# Patient Record
Sex: FEMALE | Race: WHITE | NOT HISPANIC OR LATINO | Employment: FULL TIME | URBAN - METROPOLITAN AREA
[De-identification: names, ages, dates, MRNs, and addresses within clinical notes are randomized per-mention and may not be internally consistent; named-entity substitution may affect disease eponyms.]

---

## 2023-01-27 PROBLEM — M99.00 SOMATIC DYSFUNCTION OF HEAD REGION: Status: ACTIVE | Noted: 2023-01-27

## 2023-01-27 PROBLEM — M99.02 SEGMENTAL AND SOMATIC DYSFUNCTION OF THORACIC REGION: Status: ACTIVE | Noted: 2023-01-27

## 2023-01-27 PROBLEM — R79.89 LOW VITAMIN D LEVEL: Status: ACTIVE | Noted: 2023-01-27

## 2023-01-27 PROBLEM — N63.0 BREAST LUMP: Status: ACTIVE | Noted: 2023-01-27

## 2023-01-27 PROBLEM — M99.07 SOMATIC DYSFUNCTION OF RIGHT UPPER EXTREMITY: Status: ACTIVE | Noted: 2023-01-27

## 2023-01-27 PROBLEM — M99.08 SOMATIC DYSFUNCTION OF RIB: Status: ACTIVE | Noted: 2023-01-27

## 2023-01-27 PROBLEM — M21.70 SHORT LEG SYNDROME, RIGHT, ACQUIRED: Status: ACTIVE | Noted: 2023-01-27

## 2023-01-27 PROBLEM — G43.909 MIGRAINE, UNSPECIFIED, NOT INTRACTABLE, WITHOUT STATUS MIGRAINOSUS: Status: ACTIVE | Noted: 2023-01-27

## 2023-01-27 PROBLEM — L98.7 EXCESS SKIN OF BREAST: Status: ACTIVE | Noted: 2023-01-27

## 2023-01-27 PROBLEM — M99.06 SOMATIC DYSFUNCTION OF BOTH LOWER EXTREMITIES: Status: ACTIVE | Noted: 2023-01-27

## 2023-01-27 PROBLEM — R63.2 POLYPHAGIA: Status: ACTIVE | Noted: 2023-01-27

## 2023-01-27 PROBLEM — M99.04 SEGMENTAL AND SOMATIC DYSFUNCTION OF SACRAL REGION: Status: ACTIVE | Noted: 2023-01-27

## 2023-01-27 PROBLEM — R06.00 DYSPNEA: Status: ACTIVE | Noted: 2023-01-27

## 2023-01-27 PROBLEM — M99.01 SEGMENTAL AND SOMATIC DYSFUNCTION OF CERVICAL REGION: Status: ACTIVE | Noted: 2023-01-27

## 2023-01-27 PROBLEM — I10 HYPERTENSION: Status: ACTIVE | Noted: 2023-01-27

## 2023-01-27 PROBLEM — G89.29 BACK PAIN, CHRONIC: Status: ACTIVE | Noted: 2023-01-27

## 2023-01-27 PROBLEM — M41.9 SCOLIOSIS: Status: ACTIVE | Noted: 2023-01-27

## 2023-01-27 PROBLEM — J98.4 RESTRICTIVE LUNG DISEASE: Status: ACTIVE | Noted: 2023-01-27

## 2023-01-27 PROBLEM — Q74.8: Status: ACTIVE | Noted: 2023-01-27

## 2023-01-27 PROBLEM — R22.32 LUMP OF AXILLA, LEFT: Status: ACTIVE | Noted: 2023-01-27

## 2023-01-27 PROBLEM — M54.9 BACK PAIN, CHRONIC: Status: ACTIVE | Noted: 2023-01-27

## 2023-01-27 PROBLEM — M99.03 SOMATIC DYSFUNCTION OF LUMBAR REGION: Status: ACTIVE | Noted: 2023-01-27

## 2023-01-27 PROBLEM — M99.05 SEGMENTAL AND SOMATIC DYSFUNCTION OF PELVIC REGION: Status: ACTIVE | Noted: 2023-01-27

## 2023-01-27 PROBLEM — M43.6 STIFFNESS OF NECK: Status: ACTIVE | Noted: 2023-01-27

## 2023-01-27 RX ORDER — DOCUSATE SODIUM 100 MG/1
CAPSULE, LIQUID FILLED ORAL
COMMUNITY
Start: 2021-07-08 | End: 2023-03-10 | Stop reason: SDUPTHER

## 2023-01-27 RX ORDER — SUMATRIPTAN SUCCINATE 100 MG/1
100 TABLET ORAL ONCE AS NEEDED
COMMUNITY
Start: 2015-10-09 | End: 2023-03-10 | Stop reason: SDUPTHER

## 2023-01-27 RX ORDER — PROPRANOLOL HYDROCHLORIDE 60 MG/1
1 CAPSULE, EXTENDED RELEASE ORAL ONCE
COMMUNITY
Start: 2015-10-06 | End: 2023-03-10 | Stop reason: SDUPTHER

## 2023-01-27 RX ORDER — VALACYCLOVIR HYDROCHLORIDE 1 G/1
TABLET, FILM COATED ORAL
COMMUNITY
Start: 2020-12-31 | End: 2023-03-10 | Stop reason: SDUPTHER

## 2023-01-27 RX ORDER — ALBUTEROL SULFATE 90 UG/1
AEROSOL, METERED RESPIRATORY (INHALATION)
COMMUNITY
Start: 2021-08-31

## 2023-01-27 RX ORDER — ERGOCALCIFEROL 1.25 MG/1
50000 CAPSULE ORAL
COMMUNITY
Start: 2019-05-10 | End: 2023-03-10 | Stop reason: ALTCHOICE

## 2023-01-27 RX ORDER — ACETAMINOPHEN 500 MG
TABLET ORAL
COMMUNITY
Start: 2021-07-08 | End: 2023-09-15

## 2023-02-12 PROBLEM — Q74.8: Status: ACTIVE | Noted: 2019-09-17

## 2023-02-12 PROBLEM — E66.3 OVERWEIGHT: Status: ACTIVE | Noted: 2023-02-12

## 2023-02-12 PROBLEM — G43.009 MIGRAINE WITHOUT AURA, NOT REFRACTORY: Status: ACTIVE | Noted: 2019-09-17

## 2023-02-12 PROBLEM — F33.0 MILD RECURRENT MAJOR DEPRESSION (CMS-HCC): Status: ACTIVE | Noted: 2019-09-17

## 2023-02-12 PROBLEM — I10 BENIGN ESSENTIAL HYPERTENSION: Status: ACTIVE | Noted: 2019-09-17

## 2023-02-12 PROBLEM — Z97.5 PRESENCE OF (INTRAUTERINE) CONTRACEPTIVE DEVICE: Status: ACTIVE | Noted: 2019-09-17

## 2023-02-12 RX ORDER — COPPER 313.4 MG/1
1 INTRAUTERINE DEVICE INTRAUTERINE ONCE
COMMUNITY
Start: 2019-09-17

## 2023-02-12 RX ORDER — ONDANSETRON 4 MG/1
1 TABLET, ORALLY DISINTEGRATING ORAL EVERY 8 HOURS PRN
COMMUNITY
Start: 2022-08-31 | End: 2023-03-10 | Stop reason: ALTCHOICE

## 2023-02-12 RX ORDER — CYCLOBENZAPRINE HCL 5 MG
1-2 TABLET ORAL NIGHTLY PRN
COMMUNITY
Start: 2019-10-29

## 2023-03-10 ENCOUNTER — OFFICE VISIT (OUTPATIENT)
Dept: PRIMARY CARE | Facility: CLINIC | Age: 48
End: 2023-03-10
Payer: COMMERCIAL

## 2023-03-10 VITALS
HEIGHT: 63 IN | BODY MASS INDEX: 27.29 KG/M2 | DIASTOLIC BLOOD PRESSURE: 74 MMHG | HEART RATE: 64 BPM | WEIGHT: 154 LBS | TEMPERATURE: 97.7 F | OXYGEN SATURATION: 97 % | SYSTOLIC BLOOD PRESSURE: 122 MMHG

## 2023-03-10 DIAGNOSIS — J98.4 RESTRICTIVE LUNG DISEASE: Chronic | ICD-10-CM

## 2023-03-10 DIAGNOSIS — Z12.11 SCREEN FOR COLON CANCER: ICD-10-CM

## 2023-03-10 DIAGNOSIS — G43.009 MIGRAINE WITHOUT AURA AND WITHOUT STATUS MIGRAINOSUS, NOT INTRACTABLE: Chronic | ICD-10-CM

## 2023-03-10 DIAGNOSIS — B00.1 COLD SORE: Chronic | ICD-10-CM

## 2023-03-10 DIAGNOSIS — Q74.8: Primary | Chronic | ICD-10-CM

## 2023-03-10 DIAGNOSIS — K59.04 CHRONIC IDIOPATHIC CONSTIPATION: ICD-10-CM

## 2023-03-10 PROBLEM — N63.0 BREAST LUMP: Status: RESOLVED | Noted: 2023-01-27 | Resolved: 2023-03-10

## 2023-03-10 PROBLEM — L98.7 EXCESS SKIN OF BREAST: Status: RESOLVED | Noted: 2023-01-27 | Resolved: 2023-03-10

## 2023-03-10 PROBLEM — R79.89 LOW VITAMIN D LEVEL: Chronic | Status: ACTIVE | Noted: 2023-01-27

## 2023-03-10 PROBLEM — E66.3 OVERWEIGHT: Chronic | Status: ACTIVE | Noted: 2023-02-12

## 2023-03-10 PROBLEM — M99.07 SOMATIC DYSFUNCTION OF RIGHT UPPER EXTREMITY: Status: RESOLVED | Noted: 2023-01-27 | Resolved: 2023-03-10

## 2023-03-10 PROBLEM — M99.02 SEGMENTAL AND SOMATIC DYSFUNCTION OF THORACIC REGION: Status: RESOLVED | Noted: 2023-01-27 | Resolved: 2023-03-10

## 2023-03-10 PROBLEM — M43.6 STIFFNESS OF NECK: Status: RESOLVED | Noted: 2023-01-27 | Resolved: 2023-03-10

## 2023-03-10 PROBLEM — M99.08 SOMATIC DYSFUNCTION OF RIB: Status: RESOLVED | Noted: 2023-01-27 | Resolved: 2023-03-10

## 2023-03-10 PROBLEM — M54.9 BACK PAIN, CHRONIC: Chronic | Status: ACTIVE | Noted: 2023-01-27

## 2023-03-10 PROBLEM — M99.01 SEGMENTAL AND SOMATIC DYSFUNCTION OF CERVICAL REGION: Status: RESOLVED | Noted: 2023-01-27 | Resolved: 2023-03-10

## 2023-03-10 PROBLEM — M99.06 SOMATIC DYSFUNCTION OF BOTH LOWER EXTREMITIES: Status: RESOLVED | Noted: 2023-01-27 | Resolved: 2023-03-10

## 2023-03-10 PROBLEM — M99.00 SOMATIC DYSFUNCTION OF HEAD REGION: Status: RESOLVED | Noted: 2023-01-27 | Resolved: 2023-03-10

## 2023-03-10 PROBLEM — R06.00 DYSPNEA: Status: RESOLVED | Noted: 2023-01-27 | Resolved: 2023-03-10

## 2023-03-10 PROBLEM — I10 HYPERTENSION: Chronic | Status: ACTIVE | Noted: 2023-01-27

## 2023-03-10 PROBLEM — G43.909 MIGRAINE, UNSPECIFIED, NOT INTRACTABLE, WITHOUT STATUS MIGRAINOSUS: Chronic | Status: ACTIVE | Noted: 2023-01-27

## 2023-03-10 PROBLEM — M41.9 SCOLIOSIS: Chronic | Status: ACTIVE | Noted: 2023-01-27

## 2023-03-10 PROBLEM — M99.04 SEGMENTAL AND SOMATIC DYSFUNCTION OF SACRAL REGION: Status: RESOLVED | Noted: 2023-01-27 | Resolved: 2023-03-10

## 2023-03-10 PROBLEM — M21.70 SHORT LEG SYNDROME, RIGHT, ACQUIRED: Chronic | Status: ACTIVE | Noted: 2023-01-27

## 2023-03-10 PROBLEM — G89.29 BACK PAIN, CHRONIC: Chronic | Status: ACTIVE | Noted: 2023-01-27

## 2023-03-10 PROBLEM — M99.03 SOMATIC DYSFUNCTION OF LUMBAR REGION: Status: RESOLVED | Noted: 2023-01-27 | Resolved: 2023-03-10

## 2023-03-10 PROBLEM — R63.2 POLYPHAGIA: Status: RESOLVED | Noted: 2023-01-27 | Resolved: 2023-03-10

## 2023-03-10 PROBLEM — I10 BENIGN ESSENTIAL HYPERTENSION: Chronic | Status: ACTIVE | Noted: 2019-09-17

## 2023-03-10 PROBLEM — R22.32 LUMP OF AXILLA, LEFT: Status: RESOLVED | Noted: 2023-01-27 | Resolved: 2023-03-10

## 2023-03-10 PROBLEM — M99.05 SEGMENTAL AND SOMATIC DYSFUNCTION OF PELVIC REGION: Status: RESOLVED | Noted: 2023-01-27 | Resolved: 2023-03-10

## 2023-03-10 PROCEDURE — 99214 OFFICE O/P EST MOD 30 MIN: CPT | Performed by: FAMILY MEDICINE

## 2023-03-10 PROCEDURE — 1036F TOBACCO NON-USER: CPT | Performed by: FAMILY MEDICINE

## 2023-03-10 RX ORDER — DOCUSATE SODIUM 100 MG/1
100 CAPSULE, LIQUID FILLED ORAL 2 TIMES DAILY
Qty: 180 CAPSULE | Refills: 1 | Status: SHIPPED | OUTPATIENT
Start: 2023-03-10 | End: 2024-01-17 | Stop reason: SDUPTHER

## 2023-03-10 RX ORDER — SUMATRIPTAN SUCCINATE 100 MG/1
100 TABLET ORAL ONCE AS NEEDED
Qty: 9 TABLET | Refills: 11 | Status: SHIPPED | OUTPATIENT
Start: 2023-03-10 | End: 2024-05-07 | Stop reason: SDUPTHER

## 2023-03-10 RX ORDER — PROPRANOLOL HYDROCHLORIDE 60 MG/1
60 CAPSULE, EXTENDED RELEASE ORAL DAILY
Qty: 90 CAPSULE | Refills: 1 | Status: SHIPPED | OUTPATIENT
Start: 2023-03-10 | End: 2023-10-30 | Stop reason: SDUPTHER

## 2023-03-10 RX ORDER — VALACYCLOVIR HYDROCHLORIDE 1 G/1
TABLET, FILM COATED ORAL
Qty: 12 TABLET | Refills: 1 | Status: SHIPPED | OUTPATIENT
Start: 2023-03-10

## 2023-03-10 NOTE — PROGRESS NOTES
"Subjective   Patient ID: Bekah Madrid is a 47 y.o. female who presents for Hypertension.    Bekah is here for follow-up of chronic conditions.  Is taking her blood pressure medicine as prescribed.  Labs done before appointment.    Continues to have pain in her hip.  Is scheduled with orthopedics this week.    Migraines have been well controlled with propranolol and sumatriptan as needed.         Review of Systems   Constitutional:  Negative for chills and fever.   Respiratory:  Negative for cough and shortness of breath.    Cardiovascular:  Negative for chest pain.   Gastrointestinal:  Negative for abdominal pain, diarrhea, nausea and vomiting.   Genitourinary:  Negative for dysuria.       Objective   /74   Pulse 64   Temp 36.5 °C (97.7 °F)   Ht 1.588 m (5' 2.5\")   Wt 69.9 kg (154 lb)   SpO2 97%   BMI 27.72 kg/m²     Physical Exam  Constitutional:       General: She is not in acute distress.     Appearance: Normal appearance.   HENT:      Head: Normocephalic.      Mouth/Throat:      Mouth: Mucous membranes are moist.   Eyes:      Extraocular Movements: Extraocular movements intact.      Conjunctiva/sclera: Conjunctivae normal.   Cardiovascular:      Rate and Rhythm: Normal rate and regular rhythm.      Heart sounds: No murmur heard.  Pulmonary:      Breath sounds: No wheezing or rhonchi.   Musculoskeletal:      Cervical back: Neck supple.   Skin:     General: Skin is warm and dry.   Neurological:      Mental Status: She is alert.   Psychiatric:         Mood and Affect: Mood normal.         Behavior: Behavior normal.         Assessment/Plan   Problem List Items Addressed This Visit          Respiratory    Restrictive lung disease (Chronic)     Continue albuterol as needed.            Digestive    Chronic idiopathic constipation (Chronic)     Continue docusate as needed.         Relevant Medications    docusate sodium (Colace) 100 mg capsule       Musculoskeletal    Schwartz's syndrome - Primary (Chronic) "     Following with orthopedics for scoliosis.            Infectious/Inflammatory    Cold sore    Relevant Medications    valACYclovir (Valtrex) 1 gram tablet       Other    Migraine, unspecified, not intractable, without status migrainosus (Chronic)     Continue daily propranolol and as needed sumatriptan.         Relevant Medications    propranolol LA (Inderal LA) 60 mg 24 hr capsule    SUMAtriptan (Imitrex) 100 mg tablet     Other Visit Diagnoses       Screen for colon cancer        Relevant Orders    Colonoscopy

## 2023-03-12 PROBLEM — K59.04 CHRONIC IDIOPATHIC CONSTIPATION: Chronic | Status: ACTIVE | Noted: 2023-03-12

## 2023-03-12 PROBLEM — B00.1 COLD SORE: Status: ACTIVE | Noted: 2023-03-12

## 2023-03-12 PROBLEM — K59.04 CHRONIC IDIOPATHIC CONSTIPATION: Status: ACTIVE | Noted: 2023-03-12

## 2023-03-12 PROBLEM — F33.0 MILD RECURRENT MAJOR DEPRESSION (CMS-HCC): Chronic | Status: ACTIVE | Noted: 2019-09-17

## 2023-03-12 PROBLEM — I10 BENIGN ESSENTIAL HYPERTENSION: Chronic | Status: RESOLVED | Noted: 2019-09-17 | Resolved: 2023-03-12

## 2023-03-12 PROBLEM — I10 HYPERTENSION: Chronic | Status: RESOLVED | Noted: 2023-01-27 | Resolved: 2023-03-12

## 2023-03-12 ASSESSMENT — ENCOUNTER SYMPTOMS
DYSURIA: 0
FEVER: 0
NAUSEA: 0
SHORTNESS OF BREATH: 0
VOMITING: 0
COUGH: 0
CHILLS: 0
DIARRHEA: 0
ABDOMINAL PAIN: 0

## 2023-03-24 ENCOUNTER — PROCEDURE VISIT (OUTPATIENT)
Dept: PRIMARY CARE | Facility: CLINIC | Age: 48
End: 2023-03-24
Payer: COMMERCIAL

## 2023-03-24 VITALS
BODY MASS INDEX: 27.36 KG/M2 | OXYGEN SATURATION: 99 % | TEMPERATURE: 98 F | SYSTOLIC BLOOD PRESSURE: 124 MMHG | DIASTOLIC BLOOD PRESSURE: 82 MMHG | WEIGHT: 152 LBS | HEART RATE: 80 BPM

## 2023-03-24 DIAGNOSIS — M99.02 SOMATIC DYSFUNCTION OF SPINE, THORACIC: ICD-10-CM

## 2023-03-24 DIAGNOSIS — M99.06 SOMATIC DYSFUNCTION OF BOTH LOWER EXTREMITIES: ICD-10-CM

## 2023-03-24 DIAGNOSIS — M21.70 SHORT LEG SYNDROME, RIGHT, ACQUIRED: Chronic | ICD-10-CM

## 2023-03-24 DIAGNOSIS — M99.04 SOMATIC DYSFUNCTION OF SPINE, SACRAL: ICD-10-CM

## 2023-03-24 DIAGNOSIS — M99.08 SOMATIC DYSFUNCTION OF RIB: ICD-10-CM

## 2023-03-24 DIAGNOSIS — M54.50 CHRONIC BILATERAL LOW BACK PAIN WITHOUT SCIATICA: Primary | Chronic | ICD-10-CM

## 2023-03-24 DIAGNOSIS — M99.05 SOMATIC DYSFUNCTION OF PELVIC REGION: ICD-10-CM

## 2023-03-24 DIAGNOSIS — M99.01 SOMATIC DYSFUNCTION OF SPINE, CERVICAL: ICD-10-CM

## 2023-03-24 DIAGNOSIS — G89.29 CHRONIC BILATERAL LOW BACK PAIN WITHOUT SCIATICA: Primary | Chronic | ICD-10-CM

## 2023-03-24 DIAGNOSIS — M99.03 SOMATIC DYSFUNCTION OF SPINE, LUMBAR: ICD-10-CM

## 2023-03-24 PROCEDURE — 98928 OSTEOPATH MANJ 7-8 REGIONS: CPT | Performed by: FAMILY MEDICINE

## 2023-03-24 PROCEDURE — 99213 OFFICE O/P EST LOW 20 MIN: CPT | Performed by: FAMILY MEDICINE

## 2023-03-24 ASSESSMENT — ENCOUNTER SYMPTOMS
COUGH: 0
FEVER: 0
CHILLS: 0

## 2023-03-24 NOTE — PROGRESS NOTES
Subjective   Patient ID: Bekah Madrid is a 48 y.o. female who presents for Back Pain (Pt presents for OMT).    Bekah returns today for her head and back pain.  Patient did see the orthopedic Dr. Harden to discuss her left hip issues.  Was told that she does have bursitis, but intervention not recommended at this time.  Recommended continued exercises.  Patient reports her symptoms are improving gradually.    She also has a question about shoe lifts.  She was told in the past that her leg lengths were different.  She bought over-the-counter shoe inserts to help balance out the discrepancy.         Review of Systems   Constitutional:  Negative for chills and fever.   HENT:  Negative for congestion.    Respiratory:  Negative for cough.        Objective   /82   Pulse 80   Temp 36.7 °C (98 °F)   Wt 68.9 kg (152 lb)   SpO2 99%   BMI 27.36 kg/m²     Physical Exam  Constitutional:       General: She is not in acute distress.  HENT:      Head: Normocephalic and atraumatic.   Eyes:      Extraocular Movements: Extraocular movements intact.   Pulmonary:      Effort: Pulmonary effort is normal.   Skin:     General: Skin is warm and dry.   Neurological:      Mental Status: She is alert.      Comments: +5/5 bilateral lower extremities   Psychiatric:         Mood and Affect: Mood normal.         Osteopathic exam:  - Cervical: C3 FRrSr yet  - Thoracic: T4-6 FRrSr  - Ribs: Left first rib inhalation dysfunction  - Lumbar: Right QL trigger point  - Pelvis: Left posterior innominate  - Sacrum: Right flexion  - Lower extremities: Right IT band trigger point      Assessment/Plan   Diagnoses and all orders for this visit:  Chronic bilateral low back pain without sciatica  Short leg syndrome, right, acquired  Discussed doing leg length discrepancy x-rays to determine exact length difference. Patient prefers to try using OTC shoe inserts first. Okay to call for x-ray if changes mind.     Somatic dysfunction of spine,  cervical  Somatic dysfunction of spine, thoracic  Somatic dysfunction of rib  Somatic dysfunction of pelvic region  Somatic dysfunction of spine, sacral  Somatic dysfunction of both lower extremities  Somatic dysfunction of spine, lumbar    Somatic dysfunction treated with muscle energy. Patient tolerated treatment well and reported improved pain after treatment.    Recommend drinks fluids. Follow-up in 2-3 weeks for further treatment.

## 2023-03-24 NOTE — ASSESSMENT & PLAN NOTE
Discussed doing leg length discrepancy x-rays to determine exact length difference.  Patient prefers to try using OTC shoe inserts first.  Okay to call for x-ray if changes mind.

## 2023-04-10 ENCOUNTER — PROCEDURE VISIT (OUTPATIENT)
Dept: PRIMARY CARE | Facility: CLINIC | Age: 48
End: 2023-04-10
Payer: COMMERCIAL

## 2023-04-10 VITALS
WEIGHT: 151 LBS | HEART RATE: 75 BPM | OXYGEN SATURATION: 97 % | DIASTOLIC BLOOD PRESSURE: 78 MMHG | SYSTOLIC BLOOD PRESSURE: 122 MMHG | BODY MASS INDEX: 27.79 KG/M2 | TEMPERATURE: 97.5 F | HEIGHT: 62 IN

## 2023-04-10 DIAGNOSIS — N92.6 IRREGULAR MENSTRUATION: ICD-10-CM

## 2023-04-10 DIAGNOSIS — Z11.3 SCREEN FOR STD (SEXUALLY TRANSMITTED DISEASE): ICD-10-CM

## 2023-04-10 DIAGNOSIS — Z12.4 SCREENING FOR CERVICAL CANCER: ICD-10-CM

## 2023-04-10 DIAGNOSIS — Z86.19 HISTORY OF TRICHOMONIASIS: ICD-10-CM

## 2023-04-10 DIAGNOSIS — Z01.419 WELL FEMALE EXAM WITH ROUTINE GYNECOLOGICAL EXAM: Primary | ICD-10-CM

## 2023-04-10 PROCEDURE — 88175 CYTOPATH C/V AUTO FLUID REDO: CPT

## 2023-04-10 PROCEDURE — 87624 HPV HI-RISK TYP POOLED RSLT: CPT

## 2023-04-10 PROCEDURE — 87491 CHLMYD TRACH DNA AMP PROBE: CPT

## 2023-04-10 PROCEDURE — 87591 N.GONORRHOEAE DNA AMP PROB: CPT

## 2023-04-10 PROCEDURE — 99214 OFFICE O/P EST MOD 30 MIN: CPT | Performed by: FAMILY MEDICINE

## 2023-04-10 PROCEDURE — 87661 TRICHOMONAS VAGINALIS AMPLIF: CPT

## 2023-04-10 PROCEDURE — 99396 PREV VISIT EST AGE 40-64: CPT | Performed by: FAMILY MEDICINE

## 2023-04-10 NOTE — PROGRESS NOTES
"Subjective   Patient ID: Bekah Madrid is a 48 y.o. female who presents for Gynecologic Exam.    Bekah is here for well woman exam.  She has not noticed any breast changes or pain.  Recently, she has had a problem with abnormal periods.  This is a new issue for her.  Period started 3/16 and bled through 4/5. Bled daily. Was bleeding normal flow. Has copper IUD but is unsure when it is due for removal. No pelvic pain. No vaginal discharge.              Review of Systems   Constitutional:  Negative for appetite change, chills, fatigue and fever.   HENT:  Negative for congestion, rhinorrhea, sore throat and voice change.    Eyes:  Negative for visual disturbance.   Respiratory:  Negative for cough and shortness of breath.    Cardiovascular:  Negative for chest pain and palpitations.   Gastrointestinal:  Negative for abdominal pain, constipation, diarrhea, nausea and vomiting.   Genitourinary:  Positive for menstrual problem. Negative for dyspareunia, dysuria, genital sores, pelvic pain, vaginal discharge and vaginal pain.   Skin:  Negative for rash.   Neurological:  Negative for dizziness.   Psychiatric/Behavioral:  Negative for sleep disturbance.        Objective   /78   Pulse 75   Temp 36.4 °C (97.5 °F)   Ht 1.575 m (5' 2\")   Wt 68.5 kg (151 lb)   LMP 03/16/2023 (Approximate)   SpO2 97%   BMI 27.62 kg/m²     Physical Exam  Exam conducted with a chaperone present.   Constitutional:       General: She is not in acute distress.     Appearance: Normal appearance.   HENT:      Head: Normocephalic.      Mouth/Throat:      Mouth: Mucous membranes are moist.   Eyes:      Extraocular Movements: Extraocular movements intact.      Conjunctiva/sclera: Conjunctivae normal.   Cardiovascular:      Rate and Rhythm: Normal rate and regular rhythm.      Heart sounds: No murmur heard.  Pulmonary:      Breath sounds: No wheezing or rhonchi.   Genitourinary:     Pubic Area: No rash.       Labia:         Right: No rash or " lesion.         Left: No rash or lesion.       Vagina: No vaginal discharge, bleeding or lesions.      Cervix: No cervical motion tenderness.      Uterus: Not tender.    Musculoskeletal:      Cervical back: Neck supple.   Skin:     General: Skin is warm and dry.   Neurological:      Mental Status: She is alert.   Psychiatric:         Mood and Affect: Mood normal.         Behavior: Behavior normal.         Assessment/Plan   Problem List Items Addressed This Visit          Genitourinary    Irregular menstruation     Check labs and pelvic ultrasound to further evaluate.         Relevant Orders    TSH with reflex to Free T4 if abnormal    Prolactin level    FSH    Luteinizing hormone    US pelvis transvaginal     Other Visit Diagnoses       Well female exam with routine gynecological exam    -  Primary    Screening for cervical cancer        Relevant Orders    THINPREP PAP TEST    Screen for STD (sexually transmitted disease)        Relevant Orders    THINPREP PAP TEST    History of trichomoniasis

## 2023-04-11 PROBLEM — N92.6 IRREGULAR MENSTRUATION: Status: ACTIVE | Noted: 2023-04-11

## 2023-04-11 ASSESSMENT — ENCOUNTER SYMPTOMS
FATIGUE: 0
SLEEP DISTURBANCE: 0
APPETITE CHANGE: 0
DIZZINESS: 0
SORE THROAT: 0
NAUSEA: 0
CONSTIPATION: 0
PALPITATIONS: 0
CHILLS: 0
DYSURIA: 0
FEVER: 0
VOICE CHANGE: 0
SHORTNESS OF BREATH: 0
RHINORRHEA: 0
ABDOMINAL PAIN: 0
DIARRHEA: 0
VOMITING: 0
COUGH: 0

## 2023-04-12 ENCOUNTER — LAB (OUTPATIENT)
Dept: LAB | Facility: LAB | Age: 48
End: 2023-04-12
Payer: COMMERCIAL

## 2023-04-12 DIAGNOSIS — N92.6 IRREGULAR MENSTRUATION: ICD-10-CM

## 2023-04-12 LAB
CHLAMYDIA TRACH., AMPLIFIED: NEGATIVE
N. GONORRHEA, AMPLIFIED: NEGATIVE
THYROTROPIN (MIU/L) IN SER/PLAS BY DETECTION LIMIT <= 0.05 MIU/L: 2.31 MIU/L (ref 0.44–3.98)
TRICHOMONAS VAGINALIS: NEGATIVE

## 2023-04-12 PROCEDURE — 83002 ASSAY OF GONADOTROPIN (LH): CPT

## 2023-04-12 PROCEDURE — 36415 COLL VENOUS BLD VENIPUNCTURE: CPT

## 2023-04-12 PROCEDURE — 83001 ASSAY OF GONADOTROPIN (FSH): CPT

## 2023-04-12 PROCEDURE — 84146 ASSAY OF PROLACTIN: CPT

## 2023-04-12 PROCEDURE — 84443 ASSAY THYROID STIM HORMONE: CPT

## 2023-04-13 LAB
FOLLITROPIN (IU/L) IN SER/PLAS: 23 IU/L
LUTEINIZING HORMONE (IU/ML) IN SER/PLAS: 70.1 IU/L
PROLACTIN (UG/L) IN SER/PLAS: 30.4 UG/L (ref 3–20)

## 2023-04-17 DIAGNOSIS — E22.1 HYPERPROLACTINEMIA (MULTI): Primary | ICD-10-CM

## 2023-04-18 LAB
COMPLETE PATHOLOGY REPORT: NORMAL
CONVERTED CLINICAL DIAGNOSIS-HISTORY: NORMAL
CONVERTED DIAGNOSIS COMMENT: NORMAL
CONVERTED FINAL DIAGNOSIS: NORMAL
CONVERTED FINAL REPORT PDF LINK TO COPY AND PASTE: NORMAL

## 2023-04-28 ENCOUNTER — APPOINTMENT (OUTPATIENT)
Dept: PRIMARY CARE | Facility: CLINIC | Age: 48
End: 2023-04-28
Payer: COMMERCIAL

## 2023-05-12 ENCOUNTER — LAB (OUTPATIENT)
Dept: LAB | Facility: LAB | Age: 48
End: 2023-05-12
Payer: COMMERCIAL

## 2023-05-12 ENCOUNTER — PROCEDURE VISIT (OUTPATIENT)
Dept: PRIMARY CARE | Facility: CLINIC | Age: 48
End: 2023-05-12
Payer: COMMERCIAL

## 2023-05-12 VITALS
TEMPERATURE: 97.4 F | HEART RATE: 101 BPM | WEIGHT: 150 LBS | OXYGEN SATURATION: 100 % | DIASTOLIC BLOOD PRESSURE: 80 MMHG | SYSTOLIC BLOOD PRESSURE: 122 MMHG | BODY MASS INDEX: 27.44 KG/M2

## 2023-05-12 DIAGNOSIS — M99.04 SOMATIC DYSFUNCTION OF SPINE, SACRAL: ICD-10-CM

## 2023-05-12 DIAGNOSIS — M54.2 NECK PAIN: Primary | Chronic | ICD-10-CM

## 2023-05-12 DIAGNOSIS — M99.01 SOMATIC DYSFUNCTION OF SPINE, CERVICAL: ICD-10-CM

## 2023-05-12 DIAGNOSIS — M99.05 SOMATIC DYSFUNCTION OF PELVIC REGION: ICD-10-CM

## 2023-05-12 DIAGNOSIS — M99.08 SOMATIC DYSFUNCTION OF RIB: ICD-10-CM

## 2023-05-12 DIAGNOSIS — M99.03 SOMATIC DYSFUNCTION OF SPINE, LUMBAR: ICD-10-CM

## 2023-05-12 DIAGNOSIS — M99.02 SOMATIC DYSFUNCTION OF SPINE, THORACIC: ICD-10-CM

## 2023-05-12 DIAGNOSIS — M25.552 LEFT HIP PAIN: ICD-10-CM

## 2023-05-12 DIAGNOSIS — M99.06 SOMATIC DYSFUNCTION OF BOTH LOWER EXTREMITIES: ICD-10-CM

## 2023-05-12 DIAGNOSIS — E22.1 HYPERPROLACTINEMIA (MULTI): ICD-10-CM

## 2023-05-12 LAB — PROLACTIN (UG/L) IN SER/PLAS: 19.6 UG/L (ref 3–20)

## 2023-05-12 PROCEDURE — 36415 COLL VENOUS BLD VENIPUNCTURE: CPT

## 2023-05-12 PROCEDURE — 98928 OSTEOPATH MANJ 7-8 REGIONS: CPT | Performed by: FAMILY MEDICINE

## 2023-05-12 PROCEDURE — 84146 ASSAY OF PROLACTIN: CPT

## 2023-05-12 PROCEDURE — 99213 OFFICE O/P EST LOW 20 MIN: CPT | Performed by: FAMILY MEDICINE

## 2023-05-12 ASSESSMENT — ENCOUNTER SYMPTOMS
COUGH: 0
CHILLS: 0
FEVER: 0

## 2023-05-12 NOTE — PROGRESS NOTES
Subjective   Patient ID: Bekah Madrid is a 48 y.o. female who presents for Back Pain (Pt presents for OMT).    Bekah presents for follow-up. Her hip pain has improved significantly with exercises. Neck pain also improved.         Review of Systems   Constitutional:  Negative for chills and fever.   HENT:  Negative for congestion.    Respiratory:  Negative for cough.        Objective   /80   Pulse 101   Temp 36.3 °C (97.4 °F)   Wt 68 kg (150 lb)   SpO2 100%   BMI 27.44 kg/m²     Physical Exam  Constitutional:       General: She is not in acute distress.     Appearance: Normal appearance.   HENT:      Head: Normocephalic and atraumatic.   Eyes:      Extraocular Movements: Extraocular movements intact.   Pulmonary:      Effort: Pulmonary effort is normal.   Musculoskeletal:      Cervical back: Tenderness present. No edema, rigidity or bony tenderness.      Thoracic back: No bony tenderness.      Lumbar back: Tenderness present. No edema or bony tenderness.   Skin:     General: Skin is warm and dry.   Neurological:      General: No focal deficit present.      Mental Status: She is alert.   Psychiatric:         Mood and Affect: Mood normal.       Osteopathic exam:  - Cervical: C3 FRrSr  - Thoracic: T4-6 FRrSr  - Ribs: left rib 10 inhalatio dysfunction  - Lumbar: L2 FRrSr  - Pelvis: right posterior inominate  - Sacrum:  left flexion  - Lower extremities:  right piriformis restriction      Assessment/Plan   Diagnoses and all orders for this visit:  Neck pain  Left hip pain  Somatic dysfunction of spine, cervical  Somatic dysfunction of spine, thoracic  Somatic dysfunction of rib  Somatic dysfunction of pelvic region  Somatic dysfunction of spine, sacral  Somatic dysfunction of spine, lumbar  Somatic dysfunction of both lower extremities    Somatic dysfunction treated with muscle energy. Patient tolerated treatment well and reported improved pain after treatment.    Recommend drinks fluids. Follow-up in 4-6  weeks for further treatment.

## 2023-06-07 ENCOUNTER — APPOINTMENT (OUTPATIENT)
Dept: PRIMARY CARE | Facility: CLINIC | Age: 48
End: 2023-06-07
Payer: COMMERCIAL

## 2023-06-23 ENCOUNTER — APPOINTMENT (OUTPATIENT)
Dept: PRIMARY CARE | Facility: CLINIC | Age: 48
End: 2023-06-23
Payer: COMMERCIAL

## 2023-07-05 ENCOUNTER — HOSPITAL ENCOUNTER (OUTPATIENT)
Dept: DATA CONVERSION | Facility: HOSPITAL | Age: 48
End: 2023-07-05
Attending: INTERNAL MEDICINE
Payer: COMMERCIAL

## 2023-07-05 DIAGNOSIS — K57.30 DIVERTICULOSIS OF LARGE INTESTINE WITHOUT PERFORATION OR ABSCESS WITHOUT BLEEDING: ICD-10-CM

## 2023-07-05 DIAGNOSIS — K64.8 OTHER HEMORRHOIDS: ICD-10-CM

## 2023-07-05 DIAGNOSIS — Z12.11 ENCOUNTER FOR SCREENING FOR MALIGNANT NEOPLASM OF COLON: ICD-10-CM

## 2023-07-17 ENCOUNTER — TELEPHONE (OUTPATIENT)
Dept: PRIMARY CARE | Facility: CLINIC | Age: 48
End: 2023-07-17
Payer: COMMERCIAL

## 2023-07-17 NOTE — TELEPHONE ENCOUNTER
Pt stated in March that she had a period lasting two weeks and she should call in if it happens again to get some labs done. Pt was wondering if you wanted to do labs or schedule an appointment first. This coming Thursday would donald two weeks of her period.

## 2023-07-25 ENCOUNTER — OFFICE VISIT (OUTPATIENT)
Dept: PRIMARY CARE | Facility: CLINIC | Age: 48
End: 2023-07-25
Payer: COMMERCIAL

## 2023-07-25 ENCOUNTER — LAB (OUTPATIENT)
Dept: LAB | Facility: LAB | Age: 48
End: 2023-07-25
Payer: COMMERCIAL

## 2023-07-25 VITALS
OXYGEN SATURATION: 99 % | WEIGHT: 152 LBS | BODY MASS INDEX: 27.8 KG/M2 | TEMPERATURE: 97 F | SYSTOLIC BLOOD PRESSURE: 124 MMHG | HEART RATE: 76 BPM | DIASTOLIC BLOOD PRESSURE: 72 MMHG

## 2023-07-25 DIAGNOSIS — Z11.3 SCREEN FOR STD (SEXUALLY TRANSMITTED DISEASE): ICD-10-CM

## 2023-07-25 DIAGNOSIS — Z97.5 IUD (INTRAUTERINE DEVICE) IN PLACE: ICD-10-CM

## 2023-07-25 DIAGNOSIS — N92.0 MENORRHAGIA WITH REGULAR CYCLE: Primary | ICD-10-CM

## 2023-07-25 DIAGNOSIS — N92.0 MENORRHAGIA WITH REGULAR CYCLE: ICD-10-CM

## 2023-07-25 LAB
PROLACTIN (UG/L) IN SER/PLAS: 20.8 UG/L (ref 3–20)
THYROTROPIN (MIU/L) IN SER/PLAS BY DETECTION LIMIT <= 0.05 MIU/L: 1.8 MIU/L (ref 0.44–3.98)

## 2023-07-25 PROCEDURE — 87591 N.GONORRHOEAE DNA AMP PROB: CPT

## 2023-07-25 PROCEDURE — 1036F TOBACCO NON-USER: CPT | Performed by: FAMILY MEDICINE

## 2023-07-25 PROCEDURE — 84146 ASSAY OF PROLACTIN: CPT

## 2023-07-25 PROCEDURE — 84443 ASSAY THYROID STIM HORMONE: CPT

## 2023-07-25 PROCEDURE — 99213 OFFICE O/P EST LOW 20 MIN: CPT | Performed by: FAMILY MEDICINE

## 2023-07-25 PROCEDURE — 87661 TRICHOMONAS VAGINALIS AMPLIF: CPT

## 2023-07-25 PROCEDURE — 36415 COLL VENOUS BLD VENIPUNCTURE: CPT

## 2023-07-25 PROCEDURE — 87491 CHLMYD TRACH DNA AMP PROBE: CPT

## 2023-07-25 ASSESSMENT — ENCOUNTER SYMPTOMS
ABDOMINAL PAIN: 0
CHILLS: 0
FEVER: 0

## 2023-07-25 NOTE — PROGRESS NOTES
"Subjective   Patient ID: Bekah Madrid is a 48 y.o. female who presents for Menstrual Problem (Discuss irregular menstrual cycle, lasting 2 weeks at a time x on and off \"few months\").    Bekah presents to discuss irregular bleeding. Has had ParaGard in for more than 5 years but less than 10 years (possibly placed in 2016). Typically periods last 4 days. Cycles lasted 21 days. No bleeding between periods. In March, started having heavy periods. Bled for 2 weeks. Cycles still 21 days. No pelvic pain. No vaginal discharge. Periods heavy in March and July. Normal in mnths between. Has had new seuxla partner since bleeding started. Pregnancy test negative at colonoscopy last week.         Review of Systems   Constitutional:  Negative for chills and fever.   Gastrointestinal:  Negative for abdominal pain.   Genitourinary:  Positive for menstrual problem. Negative for pelvic pain, vaginal bleeding, vaginal discharge and vaginal pain.       Objective   /72   Pulse 76   Temp 36.1 °C (97 °F)   Wt 68.9 kg (152 lb)   SpO2 99%   BMI 27.80 kg/m²     Physical Exam  Constitutional:       General: She is not in acute distress.     Appearance: Normal appearance.   HENT:      Head: Normocephalic.   Pulmonary:      Effort: Pulmonary effort is normal.   Neurological:      General: No focal deficit present.      Mental Status: She is alert.   Psychiatric:         Mood and Affect: Mood normal.         Assessment/Plan   Diagnoses and all orders for this visit:  Menorrhagia with regular cycle  Comments:  Check labs and screen for STDs.Likely due to Paragard. Plan treatment with NSAID if recurs.  Orders:  -     Prolactin level; Future  -     TSH with reflex to Free T4 if abnormal; Future  IUD (intrauterine device) in place  Screen for STD (sexually transmitted disease)  -     Trichomonas vaginalis, Amplified; Future  -     C. Trachomatis / N. Gonorrhoeae, Amplified Detection; Future         "

## 2023-07-26 LAB
CHLAMYDIA TRACH., AMPLIFIED: NEGATIVE
N. GONORRHEA, AMPLIFIED: NEGATIVE
TRICHOMONAS VAGINALIS: NEGATIVE

## 2023-08-08 ENCOUNTER — TELEPHONE (OUTPATIENT)
Dept: PRIMARY CARE | Facility: CLINIC | Age: 48
End: 2023-08-08
Payer: COMMERCIAL

## 2023-08-08 DIAGNOSIS — R79.89 ELEVATED PROLACTIN LEVEL: Primary | ICD-10-CM

## 2023-08-08 NOTE — TELEPHONE ENCOUNTER
----- Message from Alley Cazares, DO sent at 8/8/2023 12:54 PM EDT -----  Please let patient know that all STD screening negative. Her prolactin level is elevated. I would like to recheck this level after she has been fasting for 8 hours. Order in; can go to any  lab for draw

## 2023-08-22 ENCOUNTER — LAB (OUTPATIENT)
Dept: LAB | Facility: LAB | Age: 48
End: 2023-08-22
Payer: COMMERCIAL

## 2023-08-22 DIAGNOSIS — R79.89 ELEVATED PROLACTIN LEVEL: ICD-10-CM

## 2023-08-22 PROCEDURE — 84146 ASSAY OF PROLACTIN: CPT

## 2023-08-22 PROCEDURE — 36415 COLL VENOUS BLD VENIPUNCTURE: CPT

## 2023-08-23 LAB — PROLACTIN (UG/L) IN SER/PLAS: 14 UG/L (ref 3–20)

## 2023-08-25 ENCOUNTER — PROCEDURE VISIT (OUTPATIENT)
Dept: PRIMARY CARE | Facility: CLINIC | Age: 48
End: 2023-08-25
Payer: COMMERCIAL

## 2023-08-25 VITALS
HEART RATE: 77 BPM | OXYGEN SATURATION: 98 % | DIASTOLIC BLOOD PRESSURE: 80 MMHG | BODY MASS INDEX: 27.98 KG/M2 | SYSTOLIC BLOOD PRESSURE: 112 MMHG | WEIGHT: 153 LBS | TEMPERATURE: 97.3 F

## 2023-08-25 DIAGNOSIS — M99.03 SOMATIC DYSFUNCTION OF SPINE, LUMBAR: ICD-10-CM

## 2023-08-25 DIAGNOSIS — M54.2 NECK PAIN: Chronic | ICD-10-CM

## 2023-08-25 DIAGNOSIS — M99.04 SOMATIC DYSFUNCTION OF SPINE, SACRAL: ICD-10-CM

## 2023-08-25 DIAGNOSIS — M99.06 SOMATIC DYSFUNCTION OF BOTH LOWER EXTREMITIES: ICD-10-CM

## 2023-08-25 DIAGNOSIS — M99.02 SOMATIC DYSFUNCTION OF SPINE, THORACIC: ICD-10-CM

## 2023-08-25 DIAGNOSIS — M25.552 LEFT HIP PAIN: ICD-10-CM

## 2023-08-25 DIAGNOSIS — M99.01 SOMATIC DYSFUNCTION OF SPINE, CERVICAL: ICD-10-CM

## 2023-08-25 DIAGNOSIS — R79.89 ELEVATED PROLACTIN LEVEL: Primary | ICD-10-CM

## 2023-08-25 DIAGNOSIS — M99.05 SOMATIC DYSFUNCTION OF PELVIC REGION: ICD-10-CM

## 2023-08-25 DIAGNOSIS — M99.08 SOMATIC DYSFUNCTION OF RIB: ICD-10-CM

## 2023-08-25 PROCEDURE — 98928 OSTEOPATH MANJ 7-8 REGIONS: CPT | Performed by: FAMILY MEDICINE

## 2023-08-25 PROCEDURE — 99213 OFFICE O/P EST LOW 20 MIN: CPT | Performed by: FAMILY MEDICINE

## 2023-08-25 ASSESSMENT — ENCOUNTER SYMPTOMS
FEVER: 0
CHILLS: 0

## 2023-08-25 NOTE — PROGRESS NOTES
Subjective   Patient ID: Bekah Madrid is a 48 y.o. female who presents for Back Pain (Pt presents for OMT).    Bekah presents for follow-up. Having more neck pain today. Feels tight. Has been having tension headaches. Left hip pain has been improving.     Did have repeat labs done to check prolactin.          Review of Systems   Constitutional:  Negative for chills and fever.   HENT:  Negative for congestion.        Objective   /80   Pulse 77   Temp 36.3 °C (97.3 °F)   Wt 69.4 kg (153 lb)   SpO2 98%   BMI 27.98 kg/m²     Physical Exam  Constitutional:       General: She is not in acute distress.     Appearance: Normal appearance.   HENT:      Head: Normocephalic and atraumatic.   Eyes:      Extraocular Movements: Extraocular movements intact.   Pulmonary:      Effort: Pulmonary effort is normal.   Musculoskeletal:      Cervical back: Tenderness present. No edema, rigidity or bony tenderness.      Thoracic back: No bony tenderness.      Lumbar back: Tenderness present. No edema or bony tenderness.   Skin:     General: Skin is warm and dry.   Neurological:      General: No focal deficit present.      Mental Status: She is alert.   Psychiatric:         Mood and Affect: Mood normal.       Osteopathic exam:  - Cervical: left trapezius trigger point  - Thoracic: T7 FRrSr  - Ribs: left rib 10 inhalatio dysfunction  - Lumbar: L2 FRrSr  - Pelvis: right posterior inominate  - Sacrum:  left flexion  - Lower extremities:  right piriformis restriction      Assessment/Plan   Diagnoses and all orders for this visit:  Elevated prolactin level  Comments:  Unclear cause. No medications that would cause elevation. Thyroid function normal. Since normal, hold on MRI. Consult endocrinology.  Orders:  -     Referral to Endocrinology; Future  Neck pain  Left hip pain  Somatic dysfunction of spine, cervical  Somatic dysfunction of spine, thoracic  Somatic dysfunction of rib  Somatic dysfunction of pelvic region  Somatic  dysfunction of spine, sacral  Somatic dysfunction of spine, lumbar  Somatic dysfunction of both lower extremities    Somatic dysfunction treated with muscle energy. Patient tolerated treatment well and reported improved pain after treatment.    Recommend drinks fluids. Follow-up in 4-6 weeks for further treatment.

## 2023-09-15 DIAGNOSIS — R79.89 LOW VITAMIN D LEVEL: Primary | Chronic | ICD-10-CM

## 2023-09-15 RX ORDER — ACETAMINOPHEN 500 MG
50 TABLET ORAL DAILY
Qty: 90 CAPSULE | Refills: 3 | Status: SHIPPED | OUTPATIENT
Start: 2023-09-15

## 2023-10-23 DIAGNOSIS — G43.009 MIGRAINE WITHOUT AURA AND WITHOUT STATUS MIGRAINOSUS, NOT INTRACTABLE: Chronic | ICD-10-CM

## 2023-10-26 RX ORDER — PROPRANOLOL HYDROCHLORIDE 60 MG/1
60 CAPSULE, EXTENDED RELEASE ORAL DAILY
Qty: 90 CAPSULE | Refills: 1 | OUTPATIENT
Start: 2023-10-26

## 2023-10-30 DIAGNOSIS — G43.009 MIGRAINE WITHOUT AURA AND WITHOUT STATUS MIGRAINOSUS, NOT INTRACTABLE: Chronic | ICD-10-CM

## 2023-10-30 NOTE — TELEPHONE ENCOUNTER
Pt called rx line @ 10:32am requesting RF on Propranolol     Next OV 11/1/23  Pt compliant  Please advise. Thanks. JW

## 2023-10-31 RX ORDER — PROPRANOLOL HYDROCHLORIDE 60 MG/1
60 CAPSULE, EXTENDED RELEASE ORAL DAILY
Qty: 90 CAPSULE | Refills: 1 | Status: SHIPPED | OUTPATIENT
Start: 2023-10-31 | End: 2024-04-23

## 2023-11-01 ENCOUNTER — PROCEDURE VISIT (OUTPATIENT)
Dept: PRIMARY CARE | Facility: CLINIC | Age: 48
End: 2023-11-01
Payer: COMMERCIAL

## 2023-11-01 VITALS
OXYGEN SATURATION: 98 % | DIASTOLIC BLOOD PRESSURE: 72 MMHG | TEMPERATURE: 97 F | SYSTOLIC BLOOD PRESSURE: 124 MMHG | HEART RATE: 77 BPM

## 2023-11-01 DIAGNOSIS — R79.89 ELEVATED PROLACTIN LEVEL: ICD-10-CM

## 2023-11-01 DIAGNOSIS — M54.2 NECK PAIN: Primary | Chronic | ICD-10-CM

## 2023-11-01 DIAGNOSIS — M99.04 SOMATIC DYSFUNCTION OF SPINE, SACRAL: ICD-10-CM

## 2023-11-01 DIAGNOSIS — M99.06 SOMATIC DYSFUNCTION OF BOTH LOWER EXTREMITIES: ICD-10-CM

## 2023-11-01 DIAGNOSIS — M99.05 SOMATIC DYSFUNCTION OF PELVIC REGION: ICD-10-CM

## 2023-11-01 DIAGNOSIS — M99.01 SOMATIC DYSFUNCTION OF SPINE, CERVICAL: ICD-10-CM

## 2023-11-01 DIAGNOSIS — M25.552 LEFT HIP PAIN: ICD-10-CM

## 2023-11-01 DIAGNOSIS — M99.08 SOMATIC DYSFUNCTION OF RIB: ICD-10-CM

## 2023-11-01 DIAGNOSIS — M99.03 SOMATIC DYSFUNCTION OF SPINE, LUMBAR: ICD-10-CM

## 2023-11-01 DIAGNOSIS — M99.02 SOMATIC DYSFUNCTION OF SPINE, THORACIC: ICD-10-CM

## 2023-11-01 PROCEDURE — 99213 OFFICE O/P EST LOW 20 MIN: CPT | Performed by: FAMILY MEDICINE

## 2023-11-01 PROCEDURE — 98928 OSTEOPATH MANJ 7-8 REGIONS: CPT | Performed by: FAMILY MEDICINE

## 2023-11-01 ASSESSMENT — ENCOUNTER SYMPTOMS
CHILLS: 0
COUGH: 0
BACK PAIN: 1
FEVER: 0

## 2023-11-01 NOTE — PROGRESS NOTES
Subjective   Patient ID: Bekah Madrid is a 48 y.o. female who presents for Back Pain (Recheck. Pt presents for OMT).    Bekah presents for follow-up of neck and back pain. Still having intermittent pain. Stretching has been helping.     Had issue scheduling with endocrinologist. Was booked with diabetes specialist then rescheduled. Needs new referral.          Review of Systems   Constitutional:  Negative for chills and fever.   HENT:  Negative for congestion.    Respiratory:  Negative for cough.    Musculoskeletal:  Positive for back pain.       Objective   /72   Pulse 77   Temp 36.1 °C (97 °F)   SpO2 98%     Physical Exam  Constitutional:       General: She is not in acute distress.     Appearance: Normal appearance.   HENT:      Head: Normocephalic and atraumatic.   Eyes:      Extraocular Movements: Extraocular movements intact.   Pulmonary:      Effort: Pulmonary effort is normal.   Musculoskeletal:      Cervical back: Tenderness present. No edema, rigidity or bony tenderness.      Thoracic back: No bony tenderness.      Lumbar back: Tenderness present. No edema or bony tenderness.   Skin:     General: Skin is warm and dry.   Neurological:      General: No focal deficit present.      Mental Status: She is alert.      Comments: +5/5 bilateral lower extremities   Psychiatric:         Mood and Affect: Mood normal.         Osteopathic exam:  - Cervical: C4 FRrSr yet  - Thoracic: T5-7 FRrSr  - Ribs: Left first rib inhalation dysfunction  - Lumbar: Right QL trigger point  - Pelvis: Left posterior innominate  - Sacrum: Right flexion  - Lower extremities: Right IT band trigger point      Assessment/Plan   Diagnoses and all orders for this visit:  Chronic bilateral low back pain without sciatica  Short leg syndrome, right, acquired  Discussed doing leg length discrepancy x-rays to determine exact length difference. Patient prefers to try using OTC shoe inserts first. Okay to call for x-ray if changes mind.      Somatic dysfunction of spine, cervical  Somatic dysfunction of spine, thoracic  Somatic dysfunction of rib  Somatic dysfunction of pelvic region  Somatic dysfunction of spine, sacral  Somatic dysfunction of both lower extremities  Somatic dysfunction of spine, lumbar    Somatic dysfunction treated with muscle energy. Patient tolerated treatment well and reported improved pain after treatment.    Recommend drinks fluids. Follow-up in 4-6 weeks for further treatment.

## 2023-12-01 ENCOUNTER — OFFICE VISIT (OUTPATIENT)
Dept: PRIMARY CARE | Facility: CLINIC | Age: 48
End: 2023-12-01
Payer: COMMERCIAL

## 2023-12-01 VITALS
HEART RATE: 76 BPM | SYSTOLIC BLOOD PRESSURE: 124 MMHG | DIASTOLIC BLOOD PRESSURE: 72 MMHG | BODY MASS INDEX: 28.17 KG/M2 | WEIGHT: 154 LBS | OXYGEN SATURATION: 96 % | TEMPERATURE: 97.3 F

## 2023-12-01 DIAGNOSIS — G43.009 MIGRAINE WITHOUT AURA AND WITHOUT STATUS MIGRAINOSUS, NOT INTRACTABLE: Primary | Chronic | ICD-10-CM

## 2023-12-01 DIAGNOSIS — J01.40 SUBACUTE PANSINUSITIS: ICD-10-CM

## 2023-12-01 PROCEDURE — 99214 OFFICE O/P EST MOD 30 MIN: CPT | Performed by: FAMILY MEDICINE

## 2023-12-01 PROCEDURE — 1036F TOBACCO NON-USER: CPT | Performed by: FAMILY MEDICINE

## 2023-12-01 RX ORDER — CEFDINIR 300 MG/1
300 CAPSULE ORAL 2 TIMES DAILY
Qty: 14 CAPSULE | Refills: 0 | Status: SHIPPED | OUTPATIENT
Start: 2023-12-01 | End: 2023-12-08

## 2023-12-01 ASSESSMENT — ENCOUNTER SYMPTOMS
FEVER: 0
CHILLS: 0
COUGH: 0
SINUS PRESSURE: 1
EYE PAIN: 0
TROUBLE SWALLOWING: 0

## 2023-12-01 NOTE — PROGRESS NOTES
Subjective   Patient ID: Bekah Madrid is a 48 y.o. female who presents for ER Follow-up ([facility] on 11/21 Re: Migraines).    Bekah presents for ER follow-up. Went to ER for severe migraine. Given IV medication. Did have some improvement. She is still having lingering pressure above right eye. Tender to touch. Some congestion. No vision changes. No ringing in ears. No headache.          Review of Systems   Constitutional:  Negative for chills and fever.   HENT:  Positive for congestion and sinus pressure. Negative for ear pain and trouble swallowing.    Eyes:  Negative for pain and visual disturbance.   Respiratory:  Negative for cough.        Objective   /72   Pulse 76   Temp 36.3 °C (97.3 °F)   Wt 69.9 kg (154 lb)   SpO2 96%   BMI 28.17 kg/m²     Physical Exam  Constitutional:       General: She is not in acute distress.     Appearance: Normal appearance.   HENT:      Head: Normocephalic.      Right Ear: Tympanic membrane normal.      Left Ear: Tympanic membrane normal.      Mouth/Throat:      Mouth: Mucous membranes are moist.   Eyes:      Extraocular Movements: Extraocular movements intact.      Conjunctiva/sclera: Conjunctivae normal.   Cardiovascular:      Rate and Rhythm: Normal rate and regular rhythm.      Heart sounds: No murmur heard.  Pulmonary:      Breath sounds: No wheezing or rhonchi.   Musculoskeletal:      Cervical back: Neck supple.   Skin:     General: Skin is warm and dry.   Neurological:      Mental Status: She is alert.      Cranial Nerves: No cranial nerve deficit.      Motor: No weakness.      Coordination: Coordination normal.      Gait: Gait normal.      Deep Tendon Reflexes: Reflexes normal.   Psychiatric:         Mood and Affect: Mood normal.         Behavior: Behavior normal.         Assessment/Plan   Diagnoses and all orders for this visit:  Migraine without aura and without status migrainosus, not intractable  Comments:  Due to new worsening of migraine symptoms,  check head CT to further evaluate.  Orders:  -     CT head wo IV contrast; Future  Subacute pansinusitis  -     cefdinir (Omnicef) 300 mg capsule; Take 1 capsule (300 mg) by mouth 2 times a day for 7 days.

## 2023-12-13 ENCOUNTER — APPOINTMENT (OUTPATIENT)
Dept: PRIMARY CARE | Facility: CLINIC | Age: 48
End: 2023-12-13
Payer: COMMERCIAL

## 2024-01-17 ENCOUNTER — PROCEDURE VISIT (OUTPATIENT)
Dept: PRIMARY CARE | Facility: CLINIC | Age: 49
End: 2024-01-17
Payer: COMMERCIAL

## 2024-01-17 VITALS
DIASTOLIC BLOOD PRESSURE: 78 MMHG | HEART RATE: 68 BPM | TEMPERATURE: 97.2 F | WEIGHT: 155 LBS | BODY MASS INDEX: 28.35 KG/M2 | SYSTOLIC BLOOD PRESSURE: 120 MMHG

## 2024-01-17 DIAGNOSIS — M25.552 LEFT HIP PAIN: ICD-10-CM

## 2024-01-17 DIAGNOSIS — M54.50 CHRONIC BILATERAL LOW BACK PAIN WITHOUT SCIATICA: Chronic | ICD-10-CM

## 2024-01-17 DIAGNOSIS — M99.06 SOMATIC DYSFUNCTION OF BOTH LOWER EXTREMITIES: ICD-10-CM

## 2024-01-17 DIAGNOSIS — M54.2 NECK PAIN: Chronic | ICD-10-CM

## 2024-01-17 DIAGNOSIS — M99.05 SOMATIC DYSFUNCTION OF PELVIC REGION: ICD-10-CM

## 2024-01-17 DIAGNOSIS — M99.04 SOMATIC DYSFUNCTION OF SPINE, SACRAL: ICD-10-CM

## 2024-01-17 DIAGNOSIS — M99.03 SOMATIC DYSFUNCTION OF SPINE, LUMBAR: ICD-10-CM

## 2024-01-17 DIAGNOSIS — M99.02 SOMATIC DYSFUNCTION OF SPINE, THORACIC: ICD-10-CM

## 2024-01-17 DIAGNOSIS — K59.04 CHRONIC IDIOPATHIC CONSTIPATION: Primary | ICD-10-CM

## 2024-01-17 DIAGNOSIS — M99.01 SOMATIC DYSFUNCTION OF SPINE, CERVICAL: ICD-10-CM

## 2024-01-17 DIAGNOSIS — M99.08 SOMATIC DYSFUNCTION OF RIB: ICD-10-CM

## 2024-01-17 DIAGNOSIS — G89.29 CHRONIC BILATERAL LOW BACK PAIN WITHOUT SCIATICA: Chronic | ICD-10-CM

## 2024-01-17 PROCEDURE — 99214 OFFICE O/P EST MOD 30 MIN: CPT | Performed by: FAMILY MEDICINE

## 2024-01-17 PROCEDURE — 98928 OSTEOPATH MANJ 7-8 REGIONS: CPT | Performed by: FAMILY MEDICINE

## 2024-01-17 RX ORDER — DOCUSATE SODIUM 100 MG/1
100 CAPSULE, LIQUID FILLED ORAL DAILY
Qty: 90 CAPSULE | Refills: 3 | Status: SHIPPED | OUTPATIENT
Start: 2024-01-17

## 2024-01-17 ASSESSMENT — ENCOUNTER SYMPTOMS
CONSTIPATION: 1
BACK PAIN: 1
COUGH: 0

## 2024-01-17 NOTE — PROGRESS NOTES
Subjective   Patient ID: Bekah Madrid is a 48 y.o. female who presents for Back Pain (Recheck, Pt presents for OMT).    Bekah presents for follow-up of neck and back pain. Today having pain in left shoulder blade. Located above shoulder blade. Had nephew massaged which helped somewhat.     Still having intermittent issues with constipation. Stopped taking docusate because ran out. Is wondering if able to take the medication long-term.          Review of Systems   HENT:  Negative for congestion.    Respiratory:  Negative for cough.    Gastrointestinal:  Positive for constipation.   Musculoskeletal:  Positive for back pain.       Objective   /78   Pulse 68   Temp 36.2 °C (97.2 °F)   Wt 70.3 kg (155 lb)   BMI 28.35 kg/m²     Physical Exam  Constitutional:       General: She is not in acute distress.     Appearance: Normal appearance.   HENT:      Head: Normocephalic and atraumatic.   Eyes:      Extraocular Movements: Extraocular movements intact.   Pulmonary:      Effort: Pulmonary effort is normal.   Musculoskeletal:      Cervical back: Tenderness present. No edema, rigidity or bony tenderness.      Thoracic back: No bony tenderness.      Lumbar back: Tenderness present. No edema or bony tenderness.   Skin:     General: Skin is warm and dry.   Neurological:      General: No focal deficit present.      Mental Status: She is alert.      Comments: +5/5 bilateral lower extremities   Psychiatric:         Mood and Affect: Mood normal.         Osteopathic exam:  - Cervical: C3 FRrSr; left levator scapulae trigger point  - Thoracic: T5-7 FRrSr  - Ribs: Left first rib inhalation dysfunction  - Lumbar: Right QL trigger point  - Pelvis: Left posterior innominate  - Sacrum: Right flexion  - Lower extremities: Right IT band trigger point      Assessment/Plan   Bekah was seen today for back pain.  Diagnoses and all orders for this visit:  Chronic idiopathic constipation (Primary)  Comments:  Continue docusate. Increase  fiber in diet.  Orders:  -     docusate sodium (Colace) 100 mg capsule; Take 1 capsule (100 mg) by mouth once daily.  Chronic bilateral low back pain without sciatica  Left hip pain  Neck pain  Somatic dysfunction of spine, cervical  Somatic dysfunction of spine, thoracic  Somatic dysfunction of rib  Somatic dysfunction of pelvic region  Somatic dysfunction of spine, lumbar  Somatic dysfunction of spine, sacral  Somatic dysfunction of both lower extremities        Somatic dysfunction treated with muscle energy. Patient tolerated treatment well and reported improved pain after treatment.    Recommend drinks fluids. Follow-up in 4-6 weeks for further treatment.

## 2024-02-20 ENCOUNTER — APPOINTMENT (OUTPATIENT)
Dept: PRIMARY CARE | Facility: CLINIC | Age: 49
End: 2024-02-20
Payer: COMMERCIAL

## 2024-03-06 ENCOUNTER — PROCEDURE VISIT (OUTPATIENT)
Dept: PRIMARY CARE | Facility: CLINIC | Age: 49
End: 2024-03-06
Payer: COMMERCIAL

## 2024-03-06 VITALS
TEMPERATURE: 97.7 F | WEIGHT: 158 LBS | HEART RATE: 70 BPM | OXYGEN SATURATION: 97 % | DIASTOLIC BLOOD PRESSURE: 70 MMHG | BODY MASS INDEX: 28.9 KG/M2 | SYSTOLIC BLOOD PRESSURE: 120 MMHG

## 2024-03-06 DIAGNOSIS — M54.2 NECK PAIN: Chronic | ICD-10-CM

## 2024-03-06 DIAGNOSIS — M99.01 SOMATIC DYSFUNCTION OF SPINE, CERVICAL: ICD-10-CM

## 2024-03-06 DIAGNOSIS — M99.03 SOMATIC DYSFUNCTION OF SPINE, LUMBAR: ICD-10-CM

## 2024-03-06 DIAGNOSIS — M99.04 SOMATIC DYSFUNCTION OF SPINE, SACRAL: ICD-10-CM

## 2024-03-06 DIAGNOSIS — M54.50 CHRONIC BILATERAL LOW BACK PAIN WITHOUT SCIATICA: Primary | Chronic | ICD-10-CM

## 2024-03-06 DIAGNOSIS — M99.02 SOMATIC DYSFUNCTION OF SPINE, THORACIC: ICD-10-CM

## 2024-03-06 DIAGNOSIS — G89.29 CHRONIC BILATERAL LOW BACK PAIN WITHOUT SCIATICA: Primary | Chronic | ICD-10-CM

## 2024-03-06 DIAGNOSIS — M99.05 SOMATIC DYSFUNCTION OF PELVIC REGION: ICD-10-CM

## 2024-03-06 DIAGNOSIS — M99.08 SOMATIC DYSFUNCTION OF RIB: ICD-10-CM

## 2024-03-06 PROCEDURE — 98928 OSTEOPATH MANJ 7-8 REGIONS: CPT | Performed by: FAMILY MEDICINE

## 2024-03-06 PROCEDURE — 99213 OFFICE O/P EST LOW 20 MIN: CPT | Performed by: FAMILY MEDICINE

## 2024-03-06 ASSESSMENT — ENCOUNTER SYMPTOMS
COUGH: 0
FEVER: 0
BACK PAIN: 1
CHILLS: 0

## 2024-03-06 NOTE — PROGRESS NOTES
Subjective   Patient ID: Bekah Madrid is a 48 y.o. female who presents for omt.    Bekah presents with low back pain. Feels tight across low back. Neck and upper shoulders also tight.          Review of Systems   Constitutional:  Negative for chills and fever.   HENT:  Negative for congestion.    Respiratory:  Negative for cough.    Musculoskeletal:  Positive for back pain.       Objective   /70   Pulse 70   Temp 36.5 °C (97.7 °F)   Wt 71.7 kg (158 lb)   SpO2 97%   BMI 28.90 kg/m²     Physical Exam  Constitutional:       General: She is not in acute distress.     Appearance: Normal appearance.   HENT:      Head: Normocephalic and atraumatic.   Eyes:      Extraocular Movements: Extraocular movements intact.   Pulmonary:      Effort: Pulmonary effort is normal.   Musculoskeletal:      Cervical back: Tenderness present. No edema, rigidity or bony tenderness.      Thoracic back: No bony tenderness.      Lumbar back: Tenderness present. No edema or bony tenderness.   Skin:     General: Skin is warm and dry.   Neurological:      General: No focal deficit present.      Mental Status: She is alert.      Comments: +5/5 bilateral lower extremities   Psychiatric:         Mood and Affect: Mood normal.         Osteopathic exam:  - Cervical: C3 FRrSr  - Thoracic: T6 FRrSr  - Ribs: Right 10th rib inhalation dysfunction  - Lumbar: L2 FRrSr  - Pelvis: Left posterior innominate  - Sacrum: Right flexion  - Lower extremities: Right IT band trigger point      Bekah was seen today for omt.  Diagnoses and all orders for this visit:  Chronic bilateral low back pain without sciatica (Primary)  Neck pain  Somatic dysfunction of spine, cervical  Somatic dysfunction of spine, thoracic  Somatic dysfunction of rib  Somatic dysfunction of pelvic region  Somatic dysfunction of spine, lumbar  Somatic dysfunction of spine, sacral       Somatic dysfunction treated with muscle energy, myofascial release. Patient tolerated treatment well  and reported improved pain after treatment.    Recommend drinks fluids. Follow-up in 4-6 weeks for further treatment.

## 2024-04-19 ENCOUNTER — APPOINTMENT (OUTPATIENT)
Dept: PRIMARY CARE | Facility: CLINIC | Age: 49
End: 2024-04-19
Payer: COMMERCIAL

## 2024-04-23 ENCOUNTER — APPOINTMENT (OUTPATIENT)
Dept: PRIMARY CARE | Facility: CLINIC | Age: 49
End: 2024-04-23
Payer: COMMERCIAL

## 2024-04-23 DIAGNOSIS — G43.009 MIGRAINE WITHOUT AURA AND WITHOUT STATUS MIGRAINOSUS, NOT INTRACTABLE: Chronic | ICD-10-CM

## 2024-04-23 RX ORDER — PROPRANOLOL HYDROCHLORIDE 60 MG/1
60 CAPSULE, EXTENDED RELEASE ORAL DAILY
Qty: 90 CAPSULE | Refills: 0 | Status: SHIPPED | OUTPATIENT
Start: 2024-04-23

## 2024-05-03 DIAGNOSIS — G43.009 MIGRAINE WITHOUT AURA AND WITHOUT STATUS MIGRAINOSUS, NOT INTRACTABLE: Chronic | ICD-10-CM

## 2024-05-03 RX ORDER — SUMATRIPTAN SUCCINATE 100 MG/1
TABLET ORAL
Qty: 9 TABLET | Refills: 5 | OUTPATIENT
Start: 2024-05-03

## 2024-05-07 RX ORDER — SUMATRIPTAN SUCCINATE 100 MG/1
100 TABLET ORAL ONCE AS NEEDED
Qty: 9 TABLET | Refills: 6 | Status: SHIPPED | OUTPATIENT
Start: 2024-05-07

## 2024-05-07 NOTE — TELEPHONE ENCOUNTER
Pt called rx line at 339p requesting a refill on pended med    Pt OUT  Next OV 5/10  Pt uses [facility] Thx

## 2024-05-10 ENCOUNTER — PROCEDURE VISIT (OUTPATIENT)
Dept: PRIMARY CARE | Facility: CLINIC | Age: 49
End: 2024-05-10
Payer: COMMERCIAL

## 2024-05-10 VITALS
DIASTOLIC BLOOD PRESSURE: 82 MMHG | HEART RATE: 68 BPM | BODY MASS INDEX: 29.08 KG/M2 | TEMPERATURE: 97.2 F | SYSTOLIC BLOOD PRESSURE: 124 MMHG | WEIGHT: 159 LBS

## 2024-05-10 DIAGNOSIS — Z13.0 SCREENING FOR DEFICIENCY ANEMIA: ICD-10-CM

## 2024-05-10 DIAGNOSIS — M99.05 SOMATIC DYSFUNCTION OF PELVIC REGION: ICD-10-CM

## 2024-05-10 DIAGNOSIS — M54.2 NECK PAIN: Chronic | ICD-10-CM

## 2024-05-10 DIAGNOSIS — M54.50 CHRONIC BILATERAL LOW BACK PAIN WITHOUT SCIATICA: Chronic | ICD-10-CM

## 2024-05-10 DIAGNOSIS — E78.5 HYPERLIPIDEMIA, UNSPECIFIED HYPERLIPIDEMIA TYPE: Primary | ICD-10-CM

## 2024-05-10 DIAGNOSIS — G89.29 CHRONIC BILATERAL LOW BACK PAIN WITHOUT SCIATICA: Chronic | ICD-10-CM

## 2024-05-10 DIAGNOSIS — Z13.1 SCREENING FOR DIABETES MELLITUS: ICD-10-CM

## 2024-05-10 DIAGNOSIS — M99.06 SOMATIC DYSFUNCTION OF BOTH LOWER EXTREMITIES: ICD-10-CM

## 2024-05-10 DIAGNOSIS — M99.01 SOMATIC DYSFUNCTION OF SPINE, CERVICAL: ICD-10-CM

## 2024-05-10 DIAGNOSIS — M99.03 SOMATIC DYSFUNCTION OF SPINE, LUMBAR: ICD-10-CM

## 2024-05-10 DIAGNOSIS — M99.08 SOMATIC DYSFUNCTION OF RIB: ICD-10-CM

## 2024-05-10 DIAGNOSIS — M99.04 SOMATIC DYSFUNCTION OF SPINE, SACRAL: ICD-10-CM

## 2024-05-10 DIAGNOSIS — M99.02 SOMATIC DYSFUNCTION OF SPINE, THORACIC: ICD-10-CM

## 2024-05-10 PROCEDURE — 98928 OSTEOPATH MANJ 7-8 REGIONS: CPT | Performed by: FAMILY MEDICINE

## 2024-05-10 PROCEDURE — 99213 OFFICE O/P EST LOW 20 MIN: CPT | Performed by: FAMILY MEDICINE

## 2024-05-10 ASSESSMENT — ENCOUNTER SYMPTOMS
CHILLS: 0
NECK PAIN: 1
COUGH: 0
BACK PAIN: 1
FEVER: 0

## 2024-05-10 NOTE — PROGRESS NOTES
Subjective   Patient ID: Bekah Madrid is a 49 y.o. female who presents for Neck Pain (Pt presents for OMT).    Bekah has been feeling well. Her left hip pain has resolved since last treatment. Still some residual tightness in neck and low back.          Review of Systems   Constitutional:  Negative for chills and fever.   HENT:  Negative for congestion.    Respiratory:  Negative for cough.    Musculoskeletal:  Positive for back pain and neck pain.       Objective   /82   Pulse 68   Temp 36.2 °C (97.2 °F)   Wt 72.1 kg (159 lb)   BMI 29.08 kg/m²     Physical Exam  Constitutional:       General: She is not in acute distress.     Appearance: Normal appearance.   HENT:      Head: Normocephalic and atraumatic.   Eyes:      Extraocular Movements: Extraocular movements intact.   Pulmonary:      Effort: Pulmonary effort is normal.   Musculoskeletal:      Cervical back: Tenderness present. No edema, rigidity or bony tenderness.      Thoracic back: No bony tenderness.      Lumbar back: Tenderness present. No edema or bony tenderness.   Skin:     General: Skin is warm and dry.   Neurological:      General: No focal deficit present.      Mental Status: She is alert.      Comments: +5/5 bilateral lower extremities   Psychiatric:         Mood and Affect: Mood normal.         Osteopathic exam:  - Cervical: C4 FRrSr  - Thoracic: right erector spinae trigger point  - Ribs: left first rib inhalation dysfunction  - Lumbar: L2 FRrSr  - Pelvis: Left posterior innominate  - Sacrum: Right flexion  - Lower extremities: Right IT band trigger point      Bekah was seen today for neck pain.  Diagnoses and all orders for this visit:  Hyperlipidemia, unspecified hyperlipidemia type (Primary)  -     Lipid Panel; Future  -     Comprehensive Metabolic Panel; Future  Chronic bilateral low back pain without sciatica  Neck pain  Somatic dysfunction of spine, cervical  Somatic dysfunction of spine, thoracic  Somatic dysfunction of  rib  Somatic dysfunction of pelvic region  Somatic dysfunction of spine, lumbar  Somatic dysfunction of spine, sacral  Somatic dysfunction of both lower extremities  Screening for diabetes mellitus  -     Hemoglobin A1C; Future  Screening for deficiency anemia  -     CBC and Auto Differential; Future         Somatic dysfunction treated with muscle energy, myofascial release. Patient tolerated treatment well and reported improved pain after treatment.    Recommend drinks fluids. Follow-up as needed for further treatment.

## 2024-07-10 ENCOUNTER — APPOINTMENT (OUTPATIENT)
Dept: PRIMARY CARE | Facility: CLINIC | Age: 49
End: 2024-07-10
Payer: COMMERCIAL

## 2024-07-10 ENCOUNTER — LAB (OUTPATIENT)
Dept: LAB | Facility: LAB | Age: 49
End: 2024-07-10
Payer: COMMERCIAL

## 2024-07-10 VITALS
WEIGHT: 159 LBS | DIASTOLIC BLOOD PRESSURE: 70 MMHG | HEIGHT: 62 IN | SYSTOLIC BLOOD PRESSURE: 122 MMHG | OXYGEN SATURATION: 96 % | BODY MASS INDEX: 29.26 KG/M2 | TEMPERATURE: 97.4 F | HEART RATE: 73 BPM

## 2024-07-10 DIAGNOSIS — R31.9 HEMATURIA, UNSPECIFIED TYPE: ICD-10-CM

## 2024-07-10 DIAGNOSIS — R00.2 PALPITATIONS: ICD-10-CM

## 2024-07-10 DIAGNOSIS — R79.89 LOW VITAMIN D LEVEL: Chronic | ICD-10-CM

## 2024-07-10 DIAGNOSIS — Z13.1 SCREENING FOR DIABETES MELLITUS: ICD-10-CM

## 2024-07-10 DIAGNOSIS — B00.1 RECURRENT COLD SORES: ICD-10-CM

## 2024-07-10 DIAGNOSIS — R42 DIZZINESS: ICD-10-CM

## 2024-07-10 DIAGNOSIS — G47.10 HYPERSOMNIA: ICD-10-CM

## 2024-07-10 DIAGNOSIS — Z11.59 NEED FOR HEPATITIS C SCREENING TEST: ICD-10-CM

## 2024-07-10 DIAGNOSIS — R94.31 ABNORMAL EKG: ICD-10-CM

## 2024-07-10 DIAGNOSIS — G43.009 MIGRAINE WITHOUT AURA AND WITHOUT STATUS MIGRAINOSUS, NOT INTRACTABLE: Chronic | ICD-10-CM

## 2024-07-10 DIAGNOSIS — Z12.31 ENCOUNTER FOR SCREENING MAMMOGRAM FOR MALIGNANT NEOPLASM OF BREAST: ICD-10-CM

## 2024-07-10 DIAGNOSIS — Z11.3 SCREEN FOR STD (SEXUALLY TRANSMITTED DISEASE): ICD-10-CM

## 2024-07-10 DIAGNOSIS — J98.4 RESTRICTIVE LUNG DISEASE: Chronic | ICD-10-CM

## 2024-07-10 DIAGNOSIS — R53.83 FATIGUE, UNSPECIFIED TYPE: ICD-10-CM

## 2024-07-10 DIAGNOSIS — Z00.00 WELLNESS EXAMINATION: Primary | ICD-10-CM

## 2024-07-10 DIAGNOSIS — Q74.8: Chronic | ICD-10-CM

## 2024-07-10 DIAGNOSIS — M54.2 NECK PAIN: Chronic | ICD-10-CM

## 2024-07-10 DIAGNOSIS — E78.5 HYPERLIPIDEMIA, UNSPECIFIED HYPERLIPIDEMIA TYPE: ICD-10-CM

## 2024-07-10 DIAGNOSIS — K59.04 CHRONIC IDIOPATHIC CONSTIPATION: ICD-10-CM

## 2024-07-10 DIAGNOSIS — Z13.0 SCREENING FOR DEFICIENCY ANEMIA: ICD-10-CM

## 2024-07-10 DIAGNOSIS — R30.0 DYSURIA: ICD-10-CM

## 2024-07-10 DIAGNOSIS — F33.0 MILD RECURRENT MAJOR DEPRESSION (CMS-HCC): Chronic | ICD-10-CM

## 2024-07-10 DIAGNOSIS — E55.9 VITAMIN D DEFICIENCY: ICD-10-CM

## 2024-07-10 PROBLEM — N92.6 IRREGULAR MENSTRUATION: Status: RESOLVED | Noted: 2023-04-11 | Resolved: 2024-07-10

## 2024-07-10 LAB
25(OH)D3 SERPL-MCNC: 31 NG/ML (ref 30–100)
ALBUMIN SERPL BCP-MCNC: 4 G/DL (ref 3.4–5)
ALP SERPL-CCNC: 51 U/L (ref 33–110)
ALT SERPL W P-5'-P-CCNC: 14 U/L (ref 7–45)
ANION GAP SERPL CALC-SCNC: 10 MMOL/L (ref 10–20)
AST SERPL W P-5'-P-CCNC: 16 U/L (ref 9–39)
BASOPHILS # BLD AUTO: 0.03 X10*3/UL (ref 0–0.1)
BASOPHILS NFR BLD AUTO: 0.4 %
BILIRUB SERPL-MCNC: 0.6 MG/DL (ref 0–1.2)
BUN SERPL-MCNC: 10 MG/DL (ref 6–23)
CALCIUM SERPL-MCNC: 9.3 MG/DL (ref 8.6–10.3)
CHLORIDE SERPL-SCNC: 104 MMOL/L (ref 98–107)
CHOLEST SERPL-MCNC: 203 MG/DL (ref 0–199)
CHOLESTEROL/HDL RATIO: 3.5
CO2 SERPL-SCNC: 29 MMOL/L (ref 21–32)
CREAT SERPL-MCNC: 0.51 MG/DL (ref 0.5–1.05)
EGFRCR SERPLBLD CKD-EPI 2021: >90 ML/MIN/1.73M*2
EOSINOPHIL # BLD AUTO: 0.07 X10*3/UL (ref 0–0.7)
EOSINOPHIL NFR BLD AUTO: 0.8 %
ERYTHROCYTE [DISTWIDTH] IN BLOOD BY AUTOMATED COUNT: 12.3 % (ref 11.5–14.5)
FERRITIN SERPL-MCNC: 83 NG/ML (ref 8–150)
GLUCOSE SERPL-MCNC: 89 MG/DL (ref 74–99)
HCT VFR BLD AUTO: 41.3 % (ref 36–46)
HDLC SERPL-MCNC: 58.6 MG/DL
HGB BLD-MCNC: 13.7 G/DL (ref 12–16)
IMM GRANULOCYTES # BLD AUTO: 0.01 X10*3/UL (ref 0–0.7)
IMM GRANULOCYTES NFR BLD AUTO: 0.1 % (ref 0–0.9)
IRON SATN MFR SERPL: 27 % (ref 25–45)
IRON SERPL-MCNC: 100 UG/DL (ref 35–150)
LDLC SERPL CALC-MCNC: 131 MG/DL
LYMPHOCYTES # BLD AUTO: 2.15 X10*3/UL (ref 1.2–4.8)
LYMPHOCYTES NFR BLD AUTO: 26.1 %
MCH RBC QN AUTO: 33.4 PG (ref 26–34)
MCHC RBC AUTO-ENTMCNC: 33.2 G/DL (ref 32–36)
MCV RBC AUTO: 101 FL (ref 80–100)
MONOCYTES # BLD AUTO: 0.68 X10*3/UL (ref 0.1–1)
MONOCYTES NFR BLD AUTO: 8.2 %
MUCOUS THREADS #/AREA URNS AUTO: ABNORMAL /LPF
NEUTROPHILS # BLD AUTO: 5.31 X10*3/UL (ref 1.2–7.7)
NEUTROPHILS NFR BLD AUTO: 64.4 %
NON HDL CHOLESTEROL: 144 MG/DL (ref 0–149)
NRBC BLD-RTO: 0 /100 WBCS (ref 0–0)
PLATELET # BLD AUTO: 333 X10*3/UL (ref 150–450)
POC APPEARANCE, URINE: CLEAR
POC BILIRUBIN, URINE: NEGATIVE
POC BLOOD, URINE: ABNORMAL
POC COLOR, URINE: YELLOW
POC GLUCOSE, URINE: NEGATIVE MG/DL
POC KETONES, URINE: NEGATIVE MG/DL
POC LEUKOCYTES, URINE: NEGATIVE
POC NITRITE,URINE: NEGATIVE
POC PH, URINE: 7 PH
POC PROTEIN, URINE: NEGATIVE MG/DL
POC SPECIFIC GRAVITY, URINE: 1.02
POC UROBILINOGEN, URINE: 0.2 EU/DL
POTASSIUM SERPL-SCNC: 4.2 MMOL/L (ref 3.5–5.3)
PROT SERPL-MCNC: 6.7 G/DL (ref 6.4–8.2)
RBC # BLD AUTO: 4.1 X10*6/UL (ref 4–5.2)
RBC #/AREA URNS AUTO: ABNORMAL /HPF
SODIUM SERPL-SCNC: 139 MMOL/L (ref 136–145)
SQUAMOUS #/AREA URNS AUTO: ABNORMAL /HPF
TIBC SERPL-MCNC: 364 UG/DL (ref 240–445)
TRIGL SERPL-MCNC: 65 MG/DL (ref 0–149)
TSH SERPL-ACNC: 2.5 MIU/L (ref 0.44–3.98)
UIBC SERPL-MCNC: 264 UG/DL (ref 110–370)
VIT B12 SERPL-MCNC: 161 PG/ML (ref 211–911)
VLDL: 13 MG/DL (ref 0–40)
WBC # BLD AUTO: 8.3 X10*3/UL (ref 4.4–11.3)
WBC #/AREA URNS AUTO: ABNORMAL /HPF

## 2024-07-10 PROCEDURE — 82607 VITAMIN B-12: CPT

## 2024-07-10 PROCEDURE — 83036 HEMOGLOBIN GLYCOSYLATED A1C: CPT

## 2024-07-10 PROCEDURE — 99214 OFFICE O/P EST MOD 30 MIN: CPT | Performed by: FAMILY MEDICINE

## 2024-07-10 PROCEDURE — 87661 TRICHOMONAS VAGINALIS AMPLIF: CPT

## 2024-07-10 PROCEDURE — 81003 URINALYSIS AUTO W/O SCOPE: CPT | Performed by: FAMILY MEDICINE

## 2024-07-10 PROCEDURE — 84443 ASSAY THYROID STIM HORMONE: CPT

## 2024-07-10 PROCEDURE — 36415 COLL VENOUS BLD VENIPUNCTURE: CPT

## 2024-07-10 PROCEDURE — 1036F TOBACCO NON-USER: CPT | Performed by: FAMILY MEDICINE

## 2024-07-10 PROCEDURE — 80053 COMPREHEN METABOLIC PANEL: CPT

## 2024-07-10 PROCEDURE — 99396 PREV VISIT EST AGE 40-64: CPT | Performed by: FAMILY MEDICINE

## 2024-07-10 PROCEDURE — 83540 ASSAY OF IRON: CPT

## 2024-07-10 PROCEDURE — 93000 ELECTROCARDIOGRAM COMPLETE: CPT | Performed by: FAMILY MEDICINE

## 2024-07-10 PROCEDURE — 85025 COMPLETE CBC W/AUTO DIFF WBC: CPT

## 2024-07-10 PROCEDURE — 80061 LIPID PANEL: CPT

## 2024-07-10 PROCEDURE — 86803 HEPATITIS C AB TEST: CPT

## 2024-07-10 PROCEDURE — 82306 VITAMIN D 25 HYDROXY: CPT

## 2024-07-10 PROCEDURE — 87491 CHLMYD TRACH DNA AMP PROBE: CPT

## 2024-07-10 PROCEDURE — 83550 IRON BINDING TEST: CPT

## 2024-07-10 PROCEDURE — 82728 ASSAY OF FERRITIN: CPT

## 2024-07-10 PROCEDURE — 87591 N.GONORRHOEAE DNA AMP PROB: CPT

## 2024-07-10 PROCEDURE — 81001 URINALYSIS AUTO W/SCOPE: CPT

## 2024-07-10 RX ORDER — DOCUSATE SODIUM 100 MG/1
100 CAPSULE, LIQUID FILLED ORAL DAILY
Qty: 90 CAPSULE | Refills: 3 | Status: SHIPPED | OUTPATIENT
Start: 2024-07-10

## 2024-07-10 RX ORDER — PROPRANOLOL HYDROCHLORIDE 60 MG/1
60 CAPSULE, EXTENDED RELEASE ORAL DAILY
Qty: 90 CAPSULE | Refills: 1 | Status: SHIPPED | OUTPATIENT
Start: 2024-07-10

## 2024-07-10 RX ORDER — CYCLOBENZAPRINE HCL 5 MG
5-10 TABLET ORAL NIGHTLY PRN
Qty: 30 TABLET | Refills: 5 | Status: SHIPPED | OUTPATIENT
Start: 2024-07-10

## 2024-07-10 RX ORDER — SUMATRIPTAN SUCCINATE 100 MG/1
100 TABLET ORAL ONCE AS NEEDED
Qty: 9 TABLET | Refills: 6 | Status: SHIPPED | OUTPATIENT
Start: 2024-07-10

## 2024-07-10 RX ORDER — ACETAMINOPHEN 500 MG
2000 TABLET ORAL DAILY
Qty: 90 CAPSULE | Refills: 3 | Status: SHIPPED | OUTPATIENT
Start: 2024-07-10

## 2024-07-10 ASSESSMENT — ENCOUNTER SYMPTOMS
SLEEP DISTURBANCE: 0
NAUSEA: 0
DIARRHEA: 0
VOICE CHANGE: 0
PALPITATIONS: 1
ABDOMINAL PAIN: 0
HEADACHES: 0
DYSURIA: 1
DIZZINESS: 1
VOMITING: 0
COUGH: 0
RHINORRHEA: 0
SHORTNESS OF BREATH: 0
FREQUENCY: 1
FATIGUE: 1
SORE THROAT: 0
APPETITE CHANGE: 0
CONSTIPATION: 0
FEVER: 0
CHILLS: 0

## 2024-07-10 NOTE — ASSESSMENT & PLAN NOTE
EKG shows possible left ventricular hypertrophy and left atrial enlargement.  Echocardiogram ordered to further assess.  Holter monitor also ordered.

## 2024-07-10 NOTE — PROGRESS NOTES
"Subjective   Patient ID: Bekah Madrid is a 49 y.o. female who presents for Depression (recheck) and Annual Exam (Cpe.).    Bekah presents with multiple concerns.  Last night, she had an episode of dizziness, which is new for her.  She was getting up to walk from one part of her house to another when she suddenly felt very dizzy.  She also had some burning sensation in her chest.  She did not feel well so laid down to sleep.  She woke up this morning and her symptoms had resolved.  She did not have any syncope.  Currently is not having any chest pain.  She does intermittently notice palpitations.  These happen from time to time but do not occur regularly.    Has been feeling exhausted most of the time.  Feels like she never gets enough rest.  Feels exhausted throughout the day.  Often feels like she can take a nap.  Thinks that she may snore.  Does not sleep well.    She also has had some urine burning with urination.  Would like STD screening.             Review of Systems   Constitutional:  Positive for fatigue. Negative for appetite change, chills and fever.   HENT:  Negative for congestion, rhinorrhea, sore throat and voice change.    Eyes:  Negative for visual disturbance.   Respiratory:  Negative for cough and shortness of breath.    Cardiovascular:  Positive for palpitations. Negative for chest pain.   Gastrointestinal:  Negative for abdominal pain, constipation, diarrhea, nausea and vomiting.   Genitourinary:  Positive for dysuria and frequency.   Skin:  Negative for rash.   Neurological:  Positive for dizziness. Negative for syncope and headaches.   Psychiatric/Behavioral:  Negative for sleep disturbance.        Objective   /70   Pulse 73   Temp 36.3 °C (97.4 °F)   Ht 1.575 m (5' 2\")   Wt 72.1 kg (159 lb)   SpO2 96%   BMI 29.08 kg/m²     Physical Exam  Constitutional:       General: She is not in acute distress.     Appearance: Normal appearance.   HENT:      Head: Normocephalic.      Right " Ear: Tympanic membrane normal.      Left Ear: Tympanic membrane normal.      Nose: No congestion.      Mouth/Throat:      Mouth: Mucous membranes are moist.      Pharynx: No oropharyngeal exudate or posterior oropharyngeal erythema.   Eyes:      Extraocular Movements: Extraocular movements intact.      Conjunctiva/sclera: Conjunctivae normal.   Cardiovascular:      Rate and Rhythm: Normal rate and regular rhythm.      Heart sounds: No murmur heard.  Pulmonary:      Effort: Pulmonary effort is normal.      Breath sounds: No wheezing or rhonchi.   Abdominal:      General: Bowel sounds are normal.      Palpations: Abdomen is soft.      Tenderness: There is no abdominal tenderness. There is no guarding or rebound.   Musculoskeletal:         General: No swelling or tenderness.      Cervical back: Neck supple.   Skin:     General: Skin is warm and dry.   Neurological:      General: No focal deficit present.      Mental Status: She is alert.      Cranial Nerves: No cranial nerve deficit.      Motor: No weakness.      Coordination: Coordination normal.      Gait: Gait normal.   Psychiatric:         Mood and Affect: Mood normal.         Behavior: Behavior normal.         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  Continue propranolol and sumatriptan as needed.         Relevant Medications    SUMAtriptan (Imitrex) 100 mg tablet    propranolol LA (Inderal LA) 60 mg 24 hr capsule    Restrictive lung disease (Chronic) J98.4     Stable.         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         Recurrent cold sores B00.1     Chronic idiopathic constipation (Chronic) K59.04    Relevant Medications    docusate sodium (Colace) 100 mg capsule    Dizziness R42     EKG shows possible left ventricular hypertrophy and left atrial enlargement.  Echocardiogram ordered to further assess.  Holter monitor also ordered.         Relevant Orders    ECG 12 Lead (Completed)    Check orthostatic blood pressure (Completed)    Transthoracic Echo Complete    Fatigue R53.83     Check labs and sleep study to further evaluate.         Relevant Orders    Ferritin    Iron and TIBC    Vitamin D 25-Hydroxy,Total (for eval of Vitamin D levels)    Vitamin B12    Palpitations R00.2     EKG showed normal sinus rhythm.  Labs and Holter monitor ordered.         Relevant Orders    TSH with reflex to Free T4 if abnormal    ECG 12 Lead (Completed)    Holter Or Event Cardiac Monitor    Hypersomnia G47.10     Home sleep study ordered.         Relevant Orders    Home sleep apnea test (HSAT)     Other Visit Diagnoses         Codes    Wellness examination    -  Primary Z00.00    Mammogram ordered.  Pap up-to-date.  Recommend Tdap and hepatitis B vaccines.     Abnormal EKG     R94.31    Relevant Orders    Transthoracic Echo Complete    Holter Or Event Cardiac Monitor    Dysuria     R30.0    Relevant Orders    POCT UA Automated manually resulted (Completed)    Hematuria, unspecified type     R31.9    Relevant Orders    Microscopic Only, Urine    Need for hepatitis C screening test     Z11.59    Relevant Orders    Hepatitis C Antibody    Screen for STD (sexually transmitted disease)     Z11.3    Relevant Orders    Trichomonas vaginalis, Nucleic Acid Detection    C. Trachomatis / N. Gonorrhoeae, Amplified Detection    Encounter for screening mammogram for malignant neoplasm of breast     Z12.31    Relevant Orders    BI mammo bilateral screening tomosynthesis

## 2024-07-11 LAB
C TRACH RRNA SPEC QL NAA+PROBE: NEGATIVE
EST. AVERAGE GLUCOSE BLD GHB EST-MCNC: 100 MG/DL
HBA1C MFR BLD: 5.1 %
HCV AB SER QL: NONREACTIVE
N GONORRHOEA DNA SPEC QL PROBE+SIG AMP: NEGATIVE
T VAGINALIS RRNA SPEC QL NAA+PROBE: NEGATIVE

## 2024-07-18 ENCOUNTER — HOSPITAL ENCOUNTER (OUTPATIENT)
Dept: RADIOLOGY | Facility: CLINIC | Age: 49
Discharge: HOME | End: 2024-07-18
Payer: COMMERCIAL

## 2024-07-18 VITALS — HEIGHT: 62 IN | BODY MASS INDEX: 29.44 KG/M2 | WEIGHT: 160 LBS

## 2024-07-18 DIAGNOSIS — Z12.31 ENCOUNTER FOR SCREENING MAMMOGRAM FOR MALIGNANT NEOPLASM OF BREAST: ICD-10-CM

## 2024-07-18 PROCEDURE — 77067 SCR MAMMO BI INCL CAD: CPT

## 2024-08-09 ENCOUNTER — HOSPITAL ENCOUNTER (OUTPATIENT)
Dept: CARDIOLOGY | Facility: CLINIC | Age: 49
Discharge: HOME | End: 2024-08-09
Payer: COMMERCIAL

## 2024-08-09 DIAGNOSIS — R42 DIZZINESS: ICD-10-CM

## 2024-08-09 DIAGNOSIS — R94.31 ABNORMAL EKG: Primary | ICD-10-CM

## 2024-08-09 DIAGNOSIS — R94.31 ABNORMAL EKG: ICD-10-CM

## 2024-08-09 DIAGNOSIS — R00.2 PALPITATIONS: ICD-10-CM

## 2024-08-09 LAB
AORTIC VALVE PEAK VELOCITY: 1.32 M/S
AV PEAK GRADIENT: 7 MMHG
AVA (PEAK VEL): 2.35 CM2
EJECTION FRACTION APICAL 4 CHAMBER: 60.9
EJECTION FRACTION: 63 %
LEFT ATRIUM VOLUME AREA LENGTH INDEX BSA: 26.3 ML/M2
LEFT VENTRICLE INTERNAL DIMENSION DIASTOLE: 3.7 CM (ref 3.5–6)
LEFT VENTRICULAR OUTFLOW TRACT DIAMETER: 1.9 CM
MITRAL VALVE E/A RATIO: 0.92
RIGHT VENTRICLE FREE WALL PEAK S': 14 CM/S
RIGHT VENTRICLE PEAK SYSTOLIC PRESSURE: 30.9 MMHG
TRICUSPID ANNULAR PLANE SYSTOLIC EXCURSION: 2.3 CM

## 2024-08-09 PROCEDURE — 93306 TTE W/DOPPLER COMPLETE: CPT | Performed by: INTERNAL MEDICINE

## 2024-08-09 PROCEDURE — 93306 TTE W/DOPPLER COMPLETE: CPT

## 2024-08-09 PROCEDURE — 2500000004 HC RX 250 GENERAL PHARMACY W/ HCPCS (ALT 636 FOR OP/ED): Performed by: STUDENT IN AN ORGANIZED HEALTH CARE EDUCATION/TRAINING PROGRAM

## 2024-08-13 ENCOUNTER — PATIENT MESSAGE (OUTPATIENT)
Dept: PRIMARY CARE | Facility: CLINIC | Age: 49
End: 2024-08-13
Payer: COMMERCIAL

## 2024-08-13 DIAGNOSIS — R93.1 ABNORMAL ECHOCARDIOGRAM: ICD-10-CM

## 2024-08-13 DIAGNOSIS — R42 DIZZINESS: Primary | ICD-10-CM

## 2024-09-14 ENCOUNTER — HOSPITAL ENCOUNTER (EMERGENCY)
Facility: HOSPITAL | Age: 49
Discharge: HOME | End: 2024-09-14
Attending: STUDENT IN AN ORGANIZED HEALTH CARE EDUCATION/TRAINING PROGRAM
Payer: COMMERCIAL

## 2024-09-14 ENCOUNTER — APPOINTMENT (OUTPATIENT)
Dept: RADIOLOGY | Facility: HOSPITAL | Age: 49
End: 2024-09-14
Payer: COMMERCIAL

## 2024-09-14 VITALS
DIASTOLIC BLOOD PRESSURE: 80 MMHG | TEMPERATURE: 98.1 F | WEIGHT: 155 LBS | SYSTOLIC BLOOD PRESSURE: 113 MMHG | RESPIRATION RATE: 16 BRPM | BODY MASS INDEX: 29.27 KG/M2 | HEIGHT: 61 IN | OXYGEN SATURATION: 100 % | HEART RATE: 66 BPM

## 2024-09-14 DIAGNOSIS — R51.9 ACUTE NONINTRACTABLE HEADACHE, UNSPECIFIED HEADACHE TYPE: Primary | ICD-10-CM

## 2024-09-14 LAB
ALBUMIN SERPL BCP-MCNC: 4.2 G/DL (ref 3.4–5)
ALP SERPL-CCNC: 56 U/L (ref 33–110)
ALT SERPL W P-5'-P-CCNC: 12 U/L (ref 7–45)
ANION GAP SERPL CALC-SCNC: 12 MMOL/L (ref 10–20)
AST SERPL W P-5'-P-CCNC: 17 U/L (ref 9–39)
BASOPHILS # BLD AUTO: 0.05 X10*3/UL (ref 0–0.1)
BASOPHILS NFR BLD AUTO: 0.6 %
BILIRUB SERPL-MCNC: 0.6 MG/DL (ref 0–1.2)
BUN SERPL-MCNC: 7 MG/DL (ref 6–23)
CALCIUM SERPL-MCNC: 8.9 MG/DL (ref 8.6–10.3)
CHLORIDE SERPL-SCNC: 104 MMOL/L (ref 98–107)
CO2 SERPL-SCNC: 24 MMOL/L (ref 21–32)
CREAT SERPL-MCNC: 0.5 MG/DL (ref 0.5–1.05)
CRP SERPL-MCNC: <0.1 MG/DL
EGFRCR SERPLBLD CKD-EPI 2021: >90 ML/MIN/1.73M*2
EOSINOPHIL # BLD AUTO: 0.05 X10*3/UL (ref 0–0.7)
EOSINOPHIL NFR BLD AUTO: 0.6 %
ERYTHROCYTE [DISTWIDTH] IN BLOOD BY AUTOMATED COUNT: 11.7 % (ref 11.5–14.5)
ERYTHROCYTE [SEDIMENTATION RATE] IN BLOOD BY WESTERGREN METHOD: 20 MM/H (ref 0–20)
GLUCOSE SERPL-MCNC: 128 MG/DL (ref 74–99)
HCT VFR BLD AUTO: 42 % (ref 36–46)
HGB BLD-MCNC: 14.5 G/DL (ref 12–16)
IMM GRANULOCYTES # BLD AUTO: 0.03 X10*3/UL (ref 0–0.7)
IMM GRANULOCYTES NFR BLD AUTO: 0.3 % (ref 0–0.9)
LYMPHOCYTES # BLD AUTO: 1.76 X10*3/UL (ref 1.2–4.8)
LYMPHOCYTES NFR BLD AUTO: 20.4 %
MCH RBC QN AUTO: 33.5 PG (ref 26–34)
MCHC RBC AUTO-ENTMCNC: 34.5 G/DL (ref 32–36)
MCV RBC AUTO: 97 FL (ref 80–100)
MONOCYTES # BLD AUTO: 0.6 X10*3/UL (ref 0.1–1)
MONOCYTES NFR BLD AUTO: 7 %
NEUTROPHILS # BLD AUTO: 6.14 X10*3/UL (ref 1.2–7.7)
NEUTROPHILS NFR BLD AUTO: 71.1 %
NRBC BLD-RTO: 0 /100 WBCS (ref 0–0)
PLATELET # BLD AUTO: 326 X10*3/UL (ref 150–450)
POTASSIUM SERPL-SCNC: 4.3 MMOL/L (ref 3.5–5.3)
PROT SERPL-MCNC: 7.3 G/DL (ref 6.4–8.2)
RBC # BLD AUTO: 4.33 X10*6/UL (ref 4–5.2)
SODIUM SERPL-SCNC: 136 MMOL/L (ref 136–145)
WBC # BLD AUTO: 8.6 X10*3/UL (ref 4.4–11.3)

## 2024-09-14 PROCEDURE — 86140 C-REACTIVE PROTEIN: CPT | Performed by: STUDENT IN AN ORGANIZED HEALTH CARE EDUCATION/TRAINING PROGRAM

## 2024-09-14 PROCEDURE — 85652 RBC SED RATE AUTOMATED: CPT | Performed by: STUDENT IN AN ORGANIZED HEALTH CARE EDUCATION/TRAINING PROGRAM

## 2024-09-14 PROCEDURE — 96361 HYDRATE IV INFUSION ADD-ON: CPT

## 2024-09-14 PROCEDURE — 96374 THER/PROPH/DIAG INJ IV PUSH: CPT

## 2024-09-14 PROCEDURE — 70450 CT HEAD/BRAIN W/O DYE: CPT

## 2024-09-14 PROCEDURE — 2500000001 HC RX 250 WO HCPCS SELF ADMINISTERED DRUGS (ALT 637 FOR MEDICARE OP): Performed by: STUDENT IN AN ORGANIZED HEALTH CARE EDUCATION/TRAINING PROGRAM

## 2024-09-14 PROCEDURE — 85025 COMPLETE CBC W/AUTO DIFF WBC: CPT | Performed by: STUDENT IN AN ORGANIZED HEALTH CARE EDUCATION/TRAINING PROGRAM

## 2024-09-14 PROCEDURE — 36415 COLL VENOUS BLD VENIPUNCTURE: CPT | Performed by: STUDENT IN AN ORGANIZED HEALTH CARE EDUCATION/TRAINING PROGRAM

## 2024-09-14 PROCEDURE — 2500000004 HC RX 250 GENERAL PHARMACY W/ HCPCS (ALT 636 FOR OP/ED): Performed by: STUDENT IN AN ORGANIZED HEALTH CARE EDUCATION/TRAINING PROGRAM

## 2024-09-14 PROCEDURE — 80053 COMPREHEN METABOLIC PANEL: CPT | Performed by: STUDENT IN AN ORGANIZED HEALTH CARE EDUCATION/TRAINING PROGRAM

## 2024-09-14 PROCEDURE — 96375 TX/PRO/DX INJ NEW DRUG ADDON: CPT

## 2024-09-14 PROCEDURE — 70450 CT HEAD/BRAIN W/O DYE: CPT | Performed by: RADIOLOGY

## 2024-09-14 PROCEDURE — 99284 EMERGENCY DEPT VISIT MOD MDM: CPT | Mod: 25

## 2024-09-14 RX ORDER — METOCLOPRAMIDE HYDROCHLORIDE 5 MG/ML
10 INJECTION INTRAMUSCULAR; INTRAVENOUS ONCE
Status: COMPLETED | OUTPATIENT
Start: 2024-09-14 | End: 2024-09-14

## 2024-09-14 RX ORDER — DROPERIDOL 2.5 MG/ML
2.5 INJECTION, SOLUTION INTRAMUSCULAR; INTRAVENOUS ONCE
Status: COMPLETED | OUTPATIENT
Start: 2024-09-14 | End: 2024-09-14

## 2024-09-14 RX ORDER — DIPHENHYDRAMINE HCL 25 MG
25 CAPSULE ORAL ONCE
Status: COMPLETED | OUTPATIENT
Start: 2024-09-14 | End: 2024-09-14

## 2024-09-14 RX ORDER — KETOROLAC TROMETHAMINE 30 MG/ML
15 INJECTION, SOLUTION INTRAMUSCULAR; INTRAVENOUS ONCE
Status: COMPLETED | OUTPATIENT
Start: 2024-09-14 | End: 2024-09-14

## 2024-09-14 RX ORDER — DROPERIDOL 2.5 MG/ML
2.5 INJECTION, SOLUTION INTRAMUSCULAR; INTRAVENOUS ONCE
Status: DISCONTINUED | OUTPATIENT
Start: 2024-09-14 | End: 2024-09-14

## 2024-09-14 RX ORDER — TOPIRAMATE 25 MG/1
25 TABLET ORAL 2 TIMES DAILY
Qty: 28 TABLET | Refills: 0 | Status: SHIPPED | OUTPATIENT
Start: 2024-09-14 | End: 2024-09-28

## 2024-09-14 RX ORDER — INDOMETHACIN 25 MG/1
25 CAPSULE ORAL 3 TIMES DAILY PRN
Qty: 14 CAPSULE | Refills: 0 | Status: SHIPPED | OUTPATIENT
Start: 2024-09-14 | End: 2024-09-24

## 2024-09-14 RX ADMIN — KETOROLAC TROMETHAMINE 15 MG: 30 INJECTION, SOLUTION INTRAMUSCULAR at 12:31

## 2024-09-14 RX ADMIN — DIPHENHYDRAMINE HYDROCHLORIDE 25 MG: 25 CAPSULE ORAL at 12:29

## 2024-09-14 RX ADMIN — DROPERIDOL 2.5 MG: 2.5 INJECTION, SOLUTION INTRAMUSCULAR; INTRAVENOUS at 16:29

## 2024-09-14 RX ADMIN — METOCLOPRAMIDE 10 MG: 5 INJECTION, SOLUTION INTRAMUSCULAR; INTRAVENOUS at 12:31

## 2024-09-14 RX ADMIN — SODIUM CHLORIDE 1000 ML: 9 INJECTION, SOLUTION INTRAVENOUS at 12:31

## 2024-09-14 ASSESSMENT — LIFESTYLE VARIABLES
HAVE YOU EVER FELT YOU SHOULD CUT DOWN ON YOUR DRINKING: NO
EVER FELT BAD OR GUILTY ABOUT YOUR DRINKING: NO
HAVE PEOPLE ANNOYED YOU BY CRITICIZING YOUR DRINKING: NO
TOTAL SCORE: 0
EVER HAD A DRINK FIRST THING IN THE MORNING TO STEADY YOUR NERVES TO GET RID OF A HANGOVER: NO

## 2024-09-14 ASSESSMENT — PAIN DESCRIPTION - DESCRIPTORS: DESCRIPTORS: STABBING

## 2024-09-14 ASSESSMENT — PAIN SCALES - GENERAL
PAINLEVEL_OUTOF10: 8
PAINLEVEL_OUTOF10: 0 - NO PAIN
PAINLEVEL_OUTOF10: 5 - MODERATE PAIN
PAINLEVEL_OUTOF10: 5 - MODERATE PAIN

## 2024-09-14 ASSESSMENT — COLUMBIA-SUICIDE SEVERITY RATING SCALE - C-SSRS
6. HAVE YOU EVER DONE ANYTHING, STARTED TO DO ANYTHING, OR PREPARED TO DO ANYTHING TO END YOUR LIFE?: NO
1. IN THE PAST MONTH, HAVE YOU WISHED YOU WERE DEAD OR WISHED YOU COULD GO TO SLEEP AND NOT WAKE UP?: NO
2. HAVE YOU ACTUALLY HAD ANY THOUGHTS OF KILLING YOURSELF?: NO

## 2024-09-14 ASSESSMENT — PAIN DESCRIPTION - LOCATION: LOCATION: HEAD

## 2024-09-14 ASSESSMENT — PAIN - FUNCTIONAL ASSESSMENT: PAIN_FUNCTIONAL_ASSESSMENT: 0-10

## 2024-09-14 ASSESSMENT — PAIN DESCRIPTION - FREQUENCY: FREQUENCY: INTERMITTENT

## 2024-09-14 ASSESSMENT — PAIN DESCRIPTION - ORIENTATION: ORIENTATION: RIGHT

## 2024-09-14 ASSESSMENT — PAIN DESCRIPTION - PAIN TYPE: TYPE: ACUTE PAIN

## 2024-09-14 NOTE — ED PROVIDER NOTES
HPI   Chief Complaint   Patient presents with    intermittent stabbing pain right head/ hypertension       HPI: Patient is a 49-year-old female, she has a past medical history of headaches, migraines, and a history of hypertension, she is presenting to the emergency department for headache.  Headache started Thursday evening, was dull, began to progressively get worse yesterday and is plateaued today.  She endorses a sharp pain that lasts 3 seconds, occipital parietal area on the right side radiating up into the top of her head.  Associated with tearing of her eyes and ringing in her right ear when it occurs.  She denies any neck pain or stiffness, denies any trauma, no lightheadedness or dizziness, no blurry or double vision.  Says it feels a little different than her typical migraines.  No paresthesias, no weakness, no difficulty speaking or articulating, no difficulty ambulating.      ROS: Complete 12 point review of systems performed, otherwise negative except as noted in the history of present illness    PMH: Reviewed, documented below in note. Pertinents in HPI  PSH: Reviewed and documented below in note. Pertinents in HPI  SH: No tobacco or illicits, not homeless  Fam: Reviewed, noncontributory to patients current complaint  MEDS: Reviewed and documented below in note. Pertinents in HPI  ALLERGIES: Reviewed and documented below in note.            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    Other specified soft tissue disorders  "10/09/2015    Axillary swelling    Pain in left foot 06/06/2016    Acute foot pain, left    Personal history of other diseases of the nervous system and sense organs     History of migraine    Personal history of other specified conditions 01/29/2016    History of vertigo    Rash and other nonspecific skin eruption 02/02/2016    Localized rash     Past Surgical History:   Procedure Laterality Date    BACK SURGERY  10/09/2015    Back Surgery    LUMBAR FUSION  04/03/2015    Lumbar Vertebral Fusion    OTHER SURGICAL HISTORY  10/09/2015    Wedging Of A Clubfoot Cast    OTHER SURGICAL HISTORY  10/09/2015    Midfoot Capsulotomy, Posterior Release, Tendon Length Bilat    OTHER SURGICAL HISTORY  10/09/2015    Hand Repair    OTHER SURGICAL HISTORY  10/09/2015    Spinal Instrumentation Posterior Non-Segmental     Family History   Problem Relation Name Age of Onset    Other (cardiac disorder) Mother      Hypertension Mother      Other (bypass surgery) Mother      Ovarian cancer Mother          BRCA positive.  Pt has been tested and is negative for BRCA1 and 2    Coronary artery disease Mother          stent age 42    Ovarian cancer Maternal Grandmother      Heart disease Maternal Grandfather      Heart disease Other          10 aunts/uncles had heart disease by 40    Migraines Neg Hx       Social History     Tobacco Use    Smoking status: Never    Smokeless tobacco: Never   Vaping Use    Vaping status: Never Used   Substance Use Topics    Alcohol use: Never    Drug use: Never       Physical Exam   Visit Vitals  /80   Pulse 66   Temp 36.7 °C (98.1 °F) (Temporal)   Resp 16   Ht 1.549 m (5' 1\")   Wt 70.3 kg (155 lb)   SpO2 100%   BMI 29.29 kg/m²   OB Status Perimenopausal   Smoking Status Never   BSA 1.74 m²      Physical Exam  Vitals and nursing note reviewed.   Constitutional:       Appearance: Normal appearance.   HENT:      Head: Normocephalic and atraumatic.   Neck:      Vascular: No carotid bruit.   Cardiovascular:    "   Rate and Rhythm: Normal rate and regular rhythm.      Pulses: Normal pulses.      Heart sounds: Normal heart sounds.   Pulmonary:      Effort: Pulmonary effort is normal.      Breath sounds: Normal breath sounds.   Abdominal:      General: There is no distension.      Palpations: Abdomen is soft.      Tenderness: There is no abdominal tenderness. There is no guarding or rebound.   Musculoskeletal:         General: No tenderness, deformity or signs of injury.      Cervical back: Normal range of motion. No rigidity.   Skin:     General: Skin is warm and dry.      Capillary Refill: Capillary refill takes less than 2 seconds.   Neurological:      General: No focal deficit present.      Mental Status: She is alert and oriented to person, place, and time.      Cranial Nerves: No cranial nerve deficit.      Sensory: No sensory deficit.      Motor: No weakness.      Coordination: Coordination normal.      Gait: Gait normal.   Psychiatric:         Mood and Affect: Mood normal.         Behavior: Behavior normal.         CT head wo IV contrast   Final Result   No acute intracranial abnormality.        MACRO:   none        Signed by: Neena Escalante 9/14/2024 4:35 PM   Dictation workstation:   [id number]          Labs Reviewed   COMPREHENSIVE METABOLIC PANEL - Abnormal       Result Value    Glucose 128 (*)     Sodium 136      Potassium 4.3      Chloride 104      Bicarbonate 24      Anion Gap 12      Urea Nitrogen 7      Creatinine 0.50      eGFR >90      Calcium 8.9      Albumin 4.2      Alkaline Phosphatase 56      Total Protein 7.3      AST 17      Bilirubin, Total 0.6      ALT 12     SEDIMENTATION RATE, AUTOMATED - Normal    Sedimentation Rate 20     C-REACTIVE PROTEIN - Normal    C-Reactive Protein <0.10     CBC WITH AUTO DIFFERENTIAL    WBC 8.6      nRBC 0.0      RBC 4.33      Hemoglobin 14.5      Hematocrit 42.0      MCV 97      MCH 33.5      MCHC 34.5      RDW 11.7      Platelets 326      Neutrophils % 71.1      Immature  Granulocytes %, Automated 0.3      Lymphocytes % 20.4      Monocytes % 7.0      Eosinophils % 0.6      Basophils % 0.6      Neutrophils Absolute 6.14      Immature Granulocytes Absolute, Automated 0.03      Lymphocytes Absolute 1.76      Monocytes Absolute 0.60      Eosinophils Absolute 0.05      Basophils Absolute 0.05           ED Course & MDM   ED Course as of 09/16/24 0647   Sat Sep 14, 2024   1602 EKG as interpreted by myself demonstrates sinus rhythm with a ventricular rate of 60, normal axis, normal intervals, no evidence of an acute STEMI. [NS]   1704 Sed Rate: 20  Normal, r/o concern for giant cell temporal arteritis [WJ]   1732 Patient reevaluated, pain had improved.  Explained discharge instructions.  Patient comfortable with discharge at this time. [WJ]      ED Course User Index  [NS] Alex Gómez MD  [WJ] James Sullivan DO         Diagnoses as of 09/16/24 0647   Acute nonintractable headache, unspecified headache type           Medical Decision Making  All mentioned lab results, ECGs, and imaging were independently reviewed by myself  - Patient evaluated. Patient is presenting to the emergency department for headache.  Differential includes but is not limited to a migrainous type headache, cluster headache, parietal versus temporal neuralgia.  Lower suspicion for a subarachnoid hemorrhage epidural hemorrhage or subdural hemorrhage in the setting of no trauma, lower suspicion for meningitis or encephalitis in the setting of no fevers, no neck pain or stiffness, lower suspicion for giant cell temporal arteritis given the patient's age.  Basic labs were obtained and the patient is given a migraine headache of Benadryl Toradol Reglan and an IV fluids.  She had mild improvement of her symptoms without complete resolution. Droperidol was ordered.  Did add on ESR and CRP levels, those are pending at the time of signout.  I spoke with the neurologist on-call Dr. Marquis and based on the patient's  history and examination he is concerned about SUNCT syndrome.  He recommended obtaining a CT scan with noncontrast, if this is negative then she can be discharged on topiramate as well as indomethacin and follow-up with the headache clinic.  Patient was signed out to the oncoming team pending the CT scan, results of the ESR and CRP, and discharged with outpatient follow-up    - Monitored for any changes in stability or symptomatology. Patient remained stable.   - Counseled regarding labs, imaging, diagnosis, and plan. Patient was agreeable. All questions were answered. The patient was receptive and agreeable to the plan of care.   -The patient was instructed to return to the emergency department if any symptoms recurred, worsened, or if there were any additional concerns.    *Disclaimer: This note was dictated by speech recognition. Minor errors in transcription may be present. Please call with questions.    Jaylan Gómez MD             Your medication list        START taking these medications        Instructions Last Dose Given Next Dose Due   indomethacin 25 mg capsule  Commonly known as: Indocin      Take 1 capsule (25 mg) by mouth 3 times a day as needed for mild pain (1 - 3) for up to 10 days.       topiramate 25 mg tablet  Commonly known as: Topamax      Take 1 tablet (25 mg) by mouth 2 times a day for 14 days. For the first 3 days, please take once a day at that time.  Afterwards, start taking 1 tablet twice a day              ASK your doctor about these medications        Instructions Last Dose Given Next Dose Due   albuterol 90 mcg/actuation inhaler           cholecalciferol 50 mcg (2,000 unit) capsule  Commonly known as: Vitamin D-3      Take 1 capsule (50 mcg) by mouth once daily.       cyclobenzaprine 5 mg tablet  Commonly known as: Flexeril      Take 1-2 tablets (5-10 mg) by mouth as needed at bedtime for muscle spasms.       docusate sodium 100 mg capsule  Commonly known as: Colace      Take 1  capsule (100 mg) by mouth once daily.       ParaGard T 380A 380 square mm IUD  Generic drug: copper           propranolol LA 60 mg 24 hr capsule  Commonly known as: Inderal LA      Take 1 capsule (60 mg) by mouth once daily.       SUMAtriptan 100 mg tablet  Commonly known as: Imitrex      Take 1 tablet (100 mg) by mouth 1 time if needed for migraine. May repeat 2 hours later if needed. Maximum 200MG/Day       valACYclovir 1 gram tablet  Commonly known as: Valtrex      Take 2 tabs PO q12h x1 day. Start ASAP after symptom onset.                 Where to Get Your Medications        These medications were sent to [facility] #[id number] - [place], [place] - [address] [address]      Phone: [phone]   indomethacin 25 mg capsule  topiramate 25 mg tablet         Procedure  Procedures     *This report was transcribed using voice recognition software.  Every effort was made to ensure accuracy; however, inadvertent computerized transcription errors may be present.*  Alex Gómez MD  09/16/24         Alex Gómez MD  09/16/24 8013

## 2024-09-14 NOTE — PROGRESS NOTES
This progress note represents a emergency department transition note for signout of care.    Patient care was signed out to me. Please see the previous provider's notes for the full history and physical. Briefly, Bekah Madrid is 49 y.o. female who And presented to the emergency department for headaches.  It would occur on the right temporal region, lasting a few seconds at a time, associated with eye tearing.  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  Did recommend obtaining inflammatory labs of CRP and ESR in addition to CT scan.  Patient was signed out pending reevaluation after droperidol administration and lab and CT imaging results.    Patient results were overall negative.  When reevaluated, patient felt comfortable with being discharged at this time.  Was advised the supportive care instructions including abortive medication of indomethacin and initiation of topiramate.  Patient given referral to neurology.  Patient advised return precautions.  She verbalized understanding, agreed to plan, the patient was discharged home.    Stevan Sullivan DO  Emergency Medicine    ED Course as of 09/14/24 1741   Sat Sep 14, 2024   1602 EKG as interpreted by myself demonstrates sinus rhythm with a ventricular rate of 60, normal axis, normal intervals, no evidence of an acute STEMI. [NS]   1704 Sed Rate: 20  Normal, r/o concern for giant cell temporal arteritis [WJ]   1732 Patient reevaluated, pain had improved.  Explained discharge instructions.  Patient comfortable with discharge at this time. [WJ]      ED Course User Index  [NS] Alex Gómez MD  [WJ] James Sullivan DO         Diagnoses as of 09/14/24 1741   Acute nonintractable headache, unspecified headache type

## 2024-09-16 ENCOUNTER — OFFICE VISIT (OUTPATIENT)
Dept: CARDIOLOGY | Facility: CLINIC | Age: 49
End: 2024-09-16
Payer: COMMERCIAL

## 2024-09-16 VITALS
WEIGHT: 155 LBS | HEART RATE: 66 BPM | BODY MASS INDEX: 29.27 KG/M2 | HEIGHT: 61 IN | OXYGEN SATURATION: 92 % | DIASTOLIC BLOOD PRESSURE: 99 MMHG | SYSTOLIC BLOOD PRESSURE: 148 MMHG

## 2024-09-16 DIAGNOSIS — R42 DIZZINESS: ICD-10-CM

## 2024-09-16 DIAGNOSIS — R93.1 ABNORMAL ECHOCARDIOGRAM: ICD-10-CM

## 2024-09-16 DIAGNOSIS — G43.809 OTHER MIGRAINE WITHOUT STATUS MIGRAINOSUS, NOT INTRACTABLE: ICD-10-CM

## 2024-09-16 DIAGNOSIS — R00.2 PALPITATIONS: Primary | ICD-10-CM

## 2024-09-16 PROCEDURE — 99204 OFFICE O/P NEW MOD 45 MIN: CPT | Performed by: INTERNAL MEDICINE

## 2024-09-16 PROCEDURE — 99214 OFFICE O/P EST MOD 30 MIN: CPT | Performed by: INTERNAL MEDICINE

## 2024-09-16 PROCEDURE — 1036F TOBACCO NON-USER: CPT | Performed by: INTERNAL MEDICINE

## 2024-09-16 PROCEDURE — 3008F BODY MASS INDEX DOCD: CPT | Performed by: INTERNAL MEDICINE

## 2024-09-16 RX ORDER — PROPRANOLOL HYDROCHLORIDE 120 MG/1
120 CAPSULE, EXTENDED RELEASE ORAL DAILY
Qty: 90 CAPSULE | Refills: 3 | Status: SHIPPED | OUTPATIENT
Start: 2024-09-16 | End: 2025-09-16

## 2024-09-16 ASSESSMENT — ENCOUNTER SYMPTOMS
LOSS OF SENSATION IN FEET: 0
OCCASIONAL FEELINGS OF UNSTEADINESS: 0
DEPRESSION: 0

## 2024-09-16 NOTE — PROGRESS NOTES
Referred by Dr. Cazares      History Of Present Illness:    Bekah Madrid is a 49 y.o. female with complex PMH (detailed below) presenting to outpatient cardiology clinic to establish care after completing a transthoracic echocardiogram and event monitor for an episode of dizziness.  Results from both the event monitor and TTE were reassuring, no significant arrhythmia correlating with symptoms and echo showed preserved biventricular function.  She presents to outpatient cardiology clinic today in her usual state of health, has no acute cardiovascular complaints at this time is tolerating her outpatient med regimen without difficulty.  Has some ectopy on her monitor, and we discussed the possibility of increasing her propranolol given that she has done quite well on it for her migraines -and she is open to this possibility.  Not had any significant dizzy spells recently denies syncope, presyncope, changes in abdominal lower extremity edema, and no exertional chest discomfort.    Past medical history is otherwise notable for  Depression  Vitamin D deficiency   migraine  Restrictive lung disease        Review of Systems   Constitutional:  No Weight Change, No Fever, No Chills, No Night Sweats, No Fatigue, No Malaise   ENT/Mouth:  No Hearing Changes, No Ear Pain, No Nasal Congestion, No  Sinus Pain, No Hoarseness, No sore throat, No Rhinorrhea, No Swallowing  Difficulty   Eyes:  No Eye Pain, No Swelling, No Redness, No Foreign Body, No Discharge, No Vision Changes   Cardiovascular:  See HPI   Respiratory:  No Cough, No Sputum, No Wheezing, No Smoke Exposure, No Dyspnea   Gastrointestinal:  No Nausea, No Vomiting, No Diarrhea, No  Constipation, No Pain, No Heartburn, No Anorexia, No Dysphagia, No  Hematochezia, No Melena, No Flatulence, No Jaundice   Genitourinary:  No Dysmenorrhea, No DUB, No Dyspareunia, No Dysuria, No  Urinary Frequency, No Hematuria, No Urinary Incontinence, No Urgency,  No Flank Pain, No Urinary  Flow Changes   Musculoskeletal:  No Arthralgias, No Myalgias, No Joint Swelling, No  Joint Stiffness, No Back Pain, No Neck Pain, No Injury History   Skin:  No Skin Lesions, No Pruritis, No Hair Changes, No Breast/Skin Changes, No Nipple Discharge   Neuro:  No Weakness, No Numbness, No Paresthesias, No Loss of  Consciousness, No Syncope, No Dizziness, No Headache, No Coordination  Changes, No Recent Falls   Psych:  No Anxiety/Panic, No Depression, No Insomnia, No Delusions, No Rumination, No SI/HI/AH/VH, No Social Issues,  No Memory Changes, No Violence/Abuse Hx., No Eating Concerns   Heme/Lymph:  No Bruising, No Bleeding, No Transfusions History, No Lymphadenopathy   Endocrine:  No Polyuria, No Polydipsia, No Temperature Intolerance        Past Medical History:  She has a past medical history of Acute candidiasis of vulva and vagina (09/06/2017), Dizziness and giddiness (03/09/2017), Other conditions influencing health status, Other malaise (02/23/2018), 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    Past Surgical History:  She has a past surgical history that includes Other surgical history (10/09/2015); Other surgical history (10/09/2015); Back surgery (10/09/2015); Other surgical history (10/09/2015); Other surgical history (10/09/2015); and Lumbar fusion (04/03/2015).      Social History:  She reports that she has never smoked. She has never used smokeless tobacco. She reports that she does not drink alcohol and does not use drugs.    Family History:  Family History   Problem Relation Name Age of Onset    Other (cardiac disorder) Mother      Hypertension Mother      Other (bypass surgery) Mother      Ovarian cancer  "Mother          BRCA positive.  Pt has been tested and is negative for BRCA1 and 2    Coronary artery disease Mother          stent age 42    Ovarian cancer Maternal Grandmother      Heart disease Maternal Grandfather      Heart disease Other          10 aunts/uncles had heart disease by 40    Migraines Neg Hx          Allergies:  Patient has no known allergies.    Outpatient Medications:  Current Outpatient Medications   Medication Instructions    albuterol 90 mcg/actuation inhaler inhalation, Entered during TW abstraction with no instructions available    cholecalciferol (VITAMIN D-3) 50 mcg, oral, Daily    copper (ParaGard T 380A) 380 square mm IUD 1 each, intrauterine, Once, Placed 2014??    cyclobenzaprine (FLEXERIL) 5-10 mg, oral, Nightly PRN    docusate sodium (COLACE) 100 mg, oral, Daily    indomethacin (INDOCIN) 25 mg, oral, 3 times daily PRN    propranolol LA (INDERAL LA) 60 mg, oral, Daily    SUMAtriptan (IMITREX) 100 mg, oral, Once as needed, May repeat 2 hours later if needed. Maximum 200MG/Day    topiramate (TOPAMAX) 25 mg, oral, 2 times daily, For the first 3 days, please take once a day at that time.  Afterwards, start taking 1 tablet twice a day    valACYclovir (Valtrex) 1 gram tablet Take 2 tabs PO q12h x1 day. Start ASAP after symptom onset.        Last Recorded Vitals:  Vitals:    09/16/24 0759   BP: (!) 148/99   BP Location: Left arm   Patient Position: Sitting   Pulse: 66   SpO2: 92%   Weight: 70.3 kg (155 lb)   Height: 1.549 m (5' 1\")       Physical Exam:  Physical exam  GEN: calm, cooperative, asking appropriate questions  NEURO: Alert and oriented x3, CN2-12 intact, SILT, Moving all extremities without difficulty  HEENT: atraumatic, no goiter, no JVD, normal uvula   CARDS: PMI is non-displaced, RRR, no MRG  PULM: CTAB, no wheezes / rales / rhonchi   ABD: soft, non-distended, non-tender, non-tympanitic, BS in all 4 quadrants. No hepatosplenomegaly  : unremarkable  SKIN: healthy appearing, " normal skin turgor   EXT: atraumatic  VASC: b/l radial pulses are brisk and equal            Last Labs:  CBC -  Lab Results   Component Value Date    WBC 8.6 09/14/2024    HGB 14.5 09/14/2024    HCT 42.0 09/14/2024    MCV 97 09/14/2024     09/14/2024       CMP -  Lab Results   Component Value Date    CALCIUM 8.9 09/14/2024    PROT 7.3 09/14/2024    ALBUMIN 4.2 09/14/2024    AST 17 09/14/2024    ALT 12 09/14/2024    ALKPHOS 56 09/14/2024    BILITOT 0.6 09/14/2024       LIPID PANEL -   Lab Results   Component Value Date    CHOL 203 (H) 07/10/2024    HDL 58.6 07/10/2024    CHHDL 3.5 07/10/2024    VLDL 13 07/10/2024    TRIG 65 07/10/2024    NHDL 144 07/10/2024       RENAL FUNCTION PANEL -   Lab Results   Component Value Date    K 4.3 09/14/2024       Lab Results   Component Value Date    HGBA1C 5.1 07/10/2024       Additional Labs      Last Cardiology Tests:    ECG:  Encounter Date: 07/10/24   ECG 12 Lead    Narrative    Normal sinus rhythm. Possible LVH and left atrial enlargement.          Echo:  Echo Results:  Transthoracic Echo (TTE) Complete With Contrast 08/09/2024    [place] [id number]  Tel [phone] and Fax [phone]    TRANSTHORACIC ECHOCARDIOGRAM REPORT      Patient Name:      TYE VELEZ       Reading Physician:    [id number] Jonathan Nguyen MD  Study Date:        8/9/2024             Ordering Provider:    [id number] CRISTOBAL TRAN  MRN/PID:           [id number]             Fellow:  Accession#:        [id number]         Nurse:                Ellie Yarbrough RN  Date of Birth/Age: 1975 / 49 years Sonographer:          April Ayala RDCS  Gender:            F                    Additional Staff:  Height:            154.94 cm            Admit Date:  Weight:            70.31 kg             Admission Status:     Outpatient  BSA / BMI:         1.70 m2 / 29.29      Encounter#:           [id number]  kg/m2  Blood Pressure:    122/70 mmHg           Department Location:  [facility]    Study Type:    TRANSTHORACIC ECHO (TTE) COMPLETE  Diagnosis/ICD: Dizziness and giddiness-R42  Indication:    Dizziness  CPT Code:      Echo Complete w Full [id number]    Patient History:  Pertinent History: Family HX of CAD, Hyperlipidemia and Palpitations. Dizziness.    Study Detail: The following Echo studies were performed: 2D, M-Mode, Doppler and  color flow. Technically challenging study due to body habitus.  Definity used as a contrast agent for endocardial border  definition. Total contrast used for this procedure was 2 mL via IV  push.      PHYSICIAN INTERPRETATION:  Left Ventricle: The left ventricular systolic function is normal, with a visually estimated ejection fraction of 60-65%. There are no regional left ventricular wall motion abnormalities. The left ventricular cavity size is normal. Spectral Doppler shows a normal pattern of left ventricular diastolic filling.  Left Atrium: The left atrium is normal in size.  Right Ventricle: The right ventricle is normal in size. There is normal right ventricular global systolic function.  Right Atrium: The right atrium is normal in size.  Aortic Valve: The aortic valve is trileaflet. There is no evidence of aortic valve regurgitation. The peak instantaneous gradient of the aortic valve is 7.0 mmHg.  Mitral Valve: The mitral valve is normal in structure. There is trace mitral valve regurgitation.  Tricuspid Valve: The tricuspid valve is structurally normal. There is trace to mild tricuspid regurgitation. The Doppler estimated RVSP is slightly elevated at 30.9 mmHg. The RV systolic pressure may be underestimated.  Pulmonic Valve: The pulmonic valve is structurally normal. There is physiologic pulmonic valve regurgitation.  Pericardium: There is a trivial pericardial effusion. There is an anterior clear space.  Aorta: The aortic root is normal.  Systemic Veins: The inferior vena cava appears to be of normal size. There  is IVC inspiratory collapse greater than 50%.  In comparison to the previous echocardiogram(s): Compared with study dated 10/1/2021, no significant change.      CONCLUSIONS:  1. The left ventricular systolic function is normal, with a visually estimated ejection fraction of 60-65%.  2. There is normal right ventricular global systolic function.  3. Slightly elevated RVSP.    QUANTITATIVE DATA SUMMARY:  2D MEASUREMENTS:  Normal Ranges:  LAs:           3.55 cm  (2.7-4.0cm)  IVSd:          1.01 cm  (0.6-1.1cm)  LVPWd:         0.59 cm  (0.6-1.1cm)  LVIDd:         3.70 cm  (3.9-5.9cm)  LVIDs:         2.57 cm  LV Mass Index: 48 g/m2  LVEDV Index:   55 ml/m2  LV % FS        30.6 %    LA VOLUME:  Normal Ranges:  LA Vol A4C:        40.9 ml    (22+/-6mL/m2)  LA Vol A2C:        44.7 ml  LA Vol BP:         44.7 ml  LA Vol Index A4C:  24.2 ml/m2  LA Vol Index A2C:  26.3 ml/m2  LA Vol Index BP:   26.3 ml/m2  LA Area A4C:       17.0 cm2  LA Area A2C:       17.0 cm2  LA Major Axis A4C: 6.0 cm  LA Major Axis A2C: 5.5 cm  LA Volume Index:   26.0 ml/m2  LA Vol A4C:        38.0 ml  LA Vol A2C:        43.5 ml  LA Vol Index BSA:  24.1 ml/m2    RA VOLUME BY A/L METHOD:  Normal Ranges:  RA Vol A4C:        22.4 ml    (8.3-19.5ml)  RA Vol Index A4C:  13.2 ml/m2  RA Area A4C:       11.0 cm2  RA Major Axis A4C: 4.6 cm    M-MODE MEASUREMENTS:  Normal Ranges:  Ao Root: 3.20 cm (2.0-3.7cm)  LAs:     3.50 cm (2.7-4.0cm)    AORTA MEASUREMENTS:  Normal Ranges:  Asc Ao, d:    2.90 cm (2.1-3.4cm)  Ao Arch:      2.50 cm (2.0-3.6cm)  Ao Abdominal: 1.8 cm    LV SYSTOLIC FUNCTION BY 2D PLANIMETRY (MOD):  Normal Ranges:  EF-A4C View:    61 % (>=55%)  EF-A2C View:    64 %  EF-Biplane:     63 %  EF-Visual:      63 %  LV EF Reported: 63 %    LV DIASTOLIC FUNCTION:  Normal Ranges:  MV Peak E:        0.69 m/s    (0.7-1.2 m/s)  MV Peak A:        0.75 m/s    (0.42-0.7 m/s)  E/A Ratio:        0.92        (1.0-2.2)  MV lateral e'     0.07 m/s  MV A Dur:          119.89 msec  PulmV Sys Ramirez:    70.89 cm/s  PulmV Tovar Ramirez:   56.29 cm/s  PulmV S/D Ramirez:    1.26  PulmV A Revs Ramirez: 24.16 cm/s  PulmV A Revs Dur: 129.40 msec    MITRAL VALVE:  Normal Ranges:  MV DT: 217 msec (150-240msec)    AORTIC VALVE:  Normal Ranges:  AoV Vmax:      1.32 m/s (<=1.7m/s)  AoV Peak P.0 mmHg (<20mmHg)  LVOT Max Ramirez:  1.10 m/s (<=1.1m/s)  LVOT VTI:      22.50 cm  LVOT Diameter: 1.90 cm  (1.8-2.4cm)  AoV Area,Vmax: 2.35 cm2 (2.5-4.5cm2)      RIGHT VENTRICLE:  RV Basal 2.90 cm  RV Mid   1.90 cm  RV Major 6.0 cm  TAPSE:   23.1 mm  RV s'    0.14 m/s    TRICUSPID VALVE/RVSP:  Normal Ranges:  Peak TR Velocity: 2.64 m/s  RV Syst Pressure: 30.9 mmHg (< 30mmHg)    PULMONIC VALVE:  Normal Ranges:  PV Accel Time: 78 msec  (>120ms)  PV Max Ramirez:    1.2 m/s  (0.6-0.9m/s)  PV Max P.3 mmHg    Pulmonary Veins:  PulmV A Revs Dur: 129.40 msec  PulmV A Revs Ramirez: 24.16 cm/s  PulmV Tovar Ramirez:   56.29 cm/s  PulmV S/D Ramirez:    1.26  PulmV Sys Ramirez:    70.89 cm/s      [id number] Jonathan Nguyen MD  Electronically signed on 2024 at 11:45:51 AM        ** Final **         Ejection Fractions:  EF   Date/Time Value Ref Range Status   2024 11:27 AM 63 %          Cath:      Stress Test:  Stress Results:  No results found for this or any previous visit from the past 365 days.         Cardiac Imaging:  CT head wo IV contrast  Narrative: Interpreted By:  Neena Escalante,   STUDY:  CT HEAD WO IV CONTRAST; ;  2024 4:27 pm      INDICATION:  Signs/Symptoms:R sided headache. Concern for SUNCT syndrome.      COMPARISON:  2010      ACCESSION NUMBER(S):  [id number]      ORDERING CLINICIAN:  GITA BARROS      TECHNIQUE:  Serial axial images of the head were obtained without intravenous  contrast. Sagittal and coronal reconstructions were generated.      FINDINGS:  The ventricles are midline and normal in size.      There are no acute parenchymal abnormalities.      There is no hemorrhage or extra-axial fluid.       There is no obvious scalp hematoma or skull fracture.      The visualized portions of the paranasal sinuses and mastoids are  unremarkable.      COMPARISON OF FINDINGS:  The brain is similar to the prior exam.      Impression: No acute intracranial abnormality.      MACRO:  none      Signed by: Neena Escalante 9/14/2024 4:35 PM  Dictation workstation:   [id number]      Assessment/Plan   This is a clinically stable 49-year-old female here to establish care.  Currently denying all red flag cardiovascular symptoms and in her usual state of health.  On my review of the studies -the patient does have ectopy on a recent event monitor, and we will be increasing her propranolol to 120 mg once daily.  Transthoracic echocardiogram shows preserved biventricular function, with an estimated RVSP in the 30 range.  We will monitor closely for the development of symptoms but currently she is doing quite well.  Blood pressure suboptimally controlled today, however the patient tells me that her blood pressure is usually in the 120 range.  We will hold off on treating today with the understanding that if home or in office BP measurements are persistently above 130 we will start antihypertensive medications.    Follow-up with me in 1 year.      Problem List Items Addressed This Visit             ICD-10-CM    Migraine, unspecified, not intractable, without status migrainosus (Chronic) G43.909    Relevant Medications    propranolol LA (Inderal LA) 120 mg 24 hr capsule    Dizziness R42    Palpitations - Primary R00.2    Relevant Medications    propranolol LA (Inderal LA) 120 mg 24 hr capsule     Other Visit Diagnoses         Codes    Abnormal echocardiogram     R93.1              Time Spent: I spent 40 minutes reviewing medical testing, obtaining medical history and counselling and educating on diagnosis and documenting clinical encounter.   C. Barton Gillombardo, MD  Interventional Cardiology  Pager: [phone]

## 2024-10-04 ENCOUNTER — TELEPHONE (OUTPATIENT)
Dept: PRIMARY CARE | Facility: CLINIC | Age: 49
End: 2024-10-04
Payer: COMMERCIAL

## 2024-10-04 DIAGNOSIS — R51.9 ACUTE NONINTRACTABLE HEADACHE, UNSPECIFIED HEADACHE TYPE: ICD-10-CM

## 2024-10-04 NOTE — TELEPHONE ENCOUNTER
Pt called Rx line asking for a refill on her topiramate, it was prescribed by ER on 9/14 but the Doctor wanted pt to keep taking until she sees neuro. Pt does not see Neuro until Jan. Pt needs seen by us for ER follow up in order for [facility] to refill this. Please schedule pt, thanks. AM

## 2024-10-07 NOTE — TELEPHONE ENCOUNTER
Patient scheduled an appt first Aval was 10/23 patient is asking for refill on medicine only has 1 pill. [facility]

## 2024-10-08 RX ORDER — TOPIRAMATE 25 MG/1
25 TABLET ORAL 2 TIMES DAILY
Qty: 180 TABLET | Refills: 0 | Status: SHIPPED | OUTPATIENT
Start: 2024-10-08

## 2024-10-23 ENCOUNTER — APPOINTMENT (OUTPATIENT)
Dept: PRIMARY CARE | Facility: CLINIC | Age: 49
End: 2024-10-23
Payer: COMMERCIAL

## 2024-10-23 VITALS
BODY MASS INDEX: 29.51 KG/M2 | SYSTOLIC BLOOD PRESSURE: 118 MMHG | HEART RATE: 67 BPM | DIASTOLIC BLOOD PRESSURE: 80 MMHG | TEMPERATURE: 98.2 F | WEIGHT: 156.2 LBS | OXYGEN SATURATION: 100 %

## 2024-10-23 DIAGNOSIS — R79.89 LOW VITAMIN D LEVEL: Chronic | ICD-10-CM

## 2024-10-23 DIAGNOSIS — M54.81 OCCIPITAL NEURALGIA OF LEFT SIDE: Primary | ICD-10-CM

## 2024-10-23 DIAGNOSIS — G43.009 MIGRAINE WITHOUT AURA AND WITHOUT STATUS MIGRAINOSUS, NOT INTRACTABLE: Chronic | ICD-10-CM

## 2024-10-23 PROBLEM — G47.10 HYPERSOMNIA: Status: RESOLVED | Noted: 2024-07-10 | Resolved: 2024-10-23

## 2024-10-23 PROBLEM — R42 DIZZINESS: Status: RESOLVED | Noted: 2024-07-10 | Resolved: 2024-10-23

## 2024-10-23 PROBLEM — R53.83 FATIGUE: Status: RESOLVED | Noted: 2024-07-10 | Resolved: 2024-10-23

## 2024-10-23 PROCEDURE — 1036F TOBACCO NON-USER: CPT | Performed by: FAMILY MEDICINE

## 2024-10-23 PROCEDURE — 99214 OFFICE O/P EST MOD 30 MIN: CPT | Performed by: FAMILY MEDICINE

## 2024-10-23 RX ORDER — ACETAMINOPHEN 500 MG
2000 TABLET ORAL DAILY
Qty: 90 CAPSULE | Refills: 3 | Status: SHIPPED | OUTPATIENT
Start: 2024-10-23

## 2024-10-23 RX ORDER — TOPIRAMATE 25 MG/1
25 TABLET ORAL 2 TIMES DAILY
Qty: 180 TABLET | Refills: 1 | Status: SHIPPED | OUTPATIENT
Start: 2024-10-23

## 2024-10-23 ASSESSMENT — ENCOUNTER SYMPTOMS
HEADACHES: 0
FEVER: 0
COUGH: 0
DIZZINESS: 0

## 2024-10-23 ASSESSMENT — PATIENT HEALTH QUESTIONNAIRE - PHQ9
1. LITTLE INTEREST OR PLEASURE IN DOING THINGS: NOT AT ALL
2. FEELING DOWN, DEPRESSED OR HOPELESS: NOT AT ALL
SUM OF ALL RESPONSES TO PHQ9 QUESTIONS 1 AND 2: 0

## 2024-10-23 NOTE — PROGRESS NOTES
Subjective   Patient ID: Bekah Madrid is a 49 y.o. female who presents for ER Follow-up ([facility] ER 9/14) and Migraine (Stabbing pain on side of the head for a few days. Has not had pain since).    Bekah has been experiencing more frequent migraines over the last 6 months.  They have been happening several times per month despite taking the propranolol.  She is using up her sumatriptan.  In September, she had a new type of pain in her head.  There was pain shooting up the back of the head behind her left ear.  Pain was sharp and stabbing.  It was not resolving after 3 days so she went to the emergency room.  Imaging was done and negative.  She was started on a course of Topamax.  Since she has been on the Topamax, her migraines have improved significantly.  She has no longer having this sharp stabbing pain on the side of her head.  Would like to know if she could continue taking the medication.    She did see the cardiologist and her propranolol was increased due to elevated blood pressure.         Review of Systems   Constitutional:  Negative for fever.   HENT:  Negative for congestion.    Respiratory:  Negative for cough.    Neurological:  Negative for dizziness and headaches.       Objective   /80   Pulse 67   Temp 36.8 °C (98.2 °F)   Wt 70.9 kg (156 lb 3.2 oz)   SpO2 100%   BMI 29.51 kg/m²     Physical Exam  Constitutional:       General: She is not in acute distress.     Appearance: Normal appearance.   HENT:      Head: Normocephalic.   Pulmonary:      Effort: Pulmonary effort is normal.   Neurological:      General: No focal deficit present.      Mental Status: She is alert.   Psychiatric:         Mood and Affect: Mood normal.         Assessment/Plan   Problem List Items Addressed This Visit             ICD-10-CM    Migraine, unspecified, not intractable, without status migrainosus (Chronic) G43.909     Continues topiramate.          Relevant Medications    topiramate (Topamax) 25 mg tablet     Occipital neuralgia of left side - Primary M54.81     Suspect pain due to occipital neuralgia since isolated to the posterior scalp behind the left ear.  Patient has had improvement since starting topiramate.  Discussed that if symptoms recur, we can consider a neurology consult to discuss Botox injections.         Relevant Medications    topiramate (Topamax) 25 mg tablet     Other Visit Diagnoses         Codes    Low vitamin D level  (Chronic)    R79.89    Relevant Medications    cholecalciferol (Vitamin D-3) 50 mcg (2,000 unit) capsule

## 2024-10-23 NOTE — ASSESSMENT & PLAN NOTE
Suspect pain due to occipital neuralgia since isolated to the posterior scalp behind the left ear.  Patient has had improvement since starting topiramate.  Discussed that if symptoms recur, we can consider a neurology consult to discuss Botox injections.

## 2024-11-05 ENCOUNTER — APPOINTMENT (OUTPATIENT)
Dept: PRIMARY CARE | Facility: CLINIC | Age: 49
End: 2024-11-05
Payer: COMMERCIAL

## 2024-11-05 VITALS
TEMPERATURE: 97.5 F | HEART RATE: 74 BPM | DIASTOLIC BLOOD PRESSURE: 80 MMHG | OXYGEN SATURATION: 99 % | WEIGHT: 154 LBS | SYSTOLIC BLOOD PRESSURE: 122 MMHG | BODY MASS INDEX: 29.1 KG/M2

## 2024-11-05 DIAGNOSIS — M99.06 SOMATIC DYSFUNCTION OF BOTH LOWER EXTREMITIES: ICD-10-CM

## 2024-11-05 DIAGNOSIS — M99.04 SOMATIC DYSFUNCTION OF SPINE, SACRAL: ICD-10-CM

## 2024-11-05 DIAGNOSIS — M99.05 SOMATIC DYSFUNCTION OF PELVIC REGION: ICD-10-CM

## 2024-11-05 DIAGNOSIS — M99.02 SOMATIC DYSFUNCTION OF SPINE, THORACIC: ICD-10-CM

## 2024-11-05 DIAGNOSIS — M54.2 NECK PAIN: ICD-10-CM

## 2024-11-05 DIAGNOSIS — M54.50 CHRONIC BILATERAL LOW BACK PAIN WITHOUT SCIATICA: Primary | ICD-10-CM

## 2024-11-05 DIAGNOSIS — G89.29 CHRONIC BILATERAL LOW BACK PAIN WITHOUT SCIATICA: Primary | ICD-10-CM

## 2024-11-05 DIAGNOSIS — M99.01 SOMATIC DYSFUNCTION OF SPINE, CERVICAL: ICD-10-CM

## 2024-11-05 DIAGNOSIS — M99.08 SOMATIC DYSFUNCTION OF RIB: ICD-10-CM

## 2024-11-05 DIAGNOSIS — M99.03 SOMATIC DYSFUNCTION OF SPINE, LUMBAR: ICD-10-CM

## 2024-11-05 PROCEDURE — 98928 OSTEOPATH MANJ 7-8 REGIONS: CPT | Performed by: FAMILY MEDICINE

## 2024-11-05 PROCEDURE — 99213 OFFICE O/P EST LOW 20 MIN: CPT | Performed by: FAMILY MEDICINE

## 2024-11-05 ASSESSMENT — ENCOUNTER SYMPTOMS
COUGH: 0
BACK PAIN: 1

## 2024-11-05 NOTE — PROGRESS NOTES
Subjective   Patient ID: Bekah Madrid is a 49 y.o. female who presents for Back Pain (Recheck//Pt presents for OMT).    Bekah has been having pain in her low back and neck. Muscles feel tight. No radiation into arms or legs.          Review of Systems   HENT:  Negative for congestion.    Respiratory:  Negative for cough.    Musculoskeletal:  Positive for back pain.       Objective   /80   Pulse 74   Temp 36.4 °C (97.5 °F)   Wt 69.9 kg (154 lb)   SpO2 99%   BMI 29.10 kg/m²     Physical Exam  Constitutional:       General: She is not in acute distress.     Appearance: Normal appearance.   HENT:      Head: Normocephalic and atraumatic.   Eyes:      Extraocular Movements: Extraocular movements intact.   Pulmonary:      Effort: Pulmonary effort is normal.   Musculoskeletal:      Cervical back: Tenderness present. No edema, rigidity or bony tenderness.      Thoracic back: No bony tenderness.      Lumbar back: Tenderness present. No edema or bony tenderness.   Skin:     General: Skin is warm and dry.   Neurological:      General: No focal deficit present.      Mental Status: She is alert.      Comments: +5/5 bilateral lower extremities   Psychiatric:         Mood and Affect: Mood normal.         Osteopathic exam:  - Cervical: left levator scapulae trigger point  - Thoracic: T4-6 FRrSr  - Ribs: Right 10th rib inhalation dysfunction  - Lumbar: L2 FRrSr  - Pelvis: right posterior innominate  - Sacrum: left flexion  - Lower extremities: Right IT band trigger point      Bekah was seen today for back pain.  Diagnoses and all orders for this visit:  Chronic bilateral low back pain without sciatica (Primary)  Neck pain  Somatic dysfunction of spine, cervical  Somatic dysfunction of spine, thoracic  Somatic dysfunction of rib  Somatic dysfunction of pelvic region  Somatic dysfunction of spine, lumbar  Somatic dysfunction of spine, sacral  Somatic dysfunction of both lower extremities         Somatic dysfunction treated  with muscle energy, myofascial release and soft tissue technique. Patient tolerated treatment well and reported improved pain after treatment.    Recommend drinks fluids. Follow-up in 4-6 weeks for further treatment.

## 2024-12-27 ENCOUNTER — APPOINTMENT (OUTPATIENT)
Dept: PRIMARY CARE | Facility: CLINIC | Age: 49
End: 2024-12-27
Payer: COMMERCIAL

## 2024-12-27 VITALS
HEART RATE: 55 BPM | SYSTOLIC BLOOD PRESSURE: 110 MMHG | BODY MASS INDEX: 29.29 KG/M2 | DIASTOLIC BLOOD PRESSURE: 76 MMHG | WEIGHT: 155 LBS | TEMPERATURE: 97.2 F | OXYGEN SATURATION: 99 %

## 2024-12-27 DIAGNOSIS — M99.03 SOMATIC DYSFUNCTION OF SPINE, LUMBAR: ICD-10-CM

## 2024-12-27 DIAGNOSIS — M99.05 SOMATIC DYSFUNCTION OF PELVIC REGION: ICD-10-CM

## 2024-12-27 DIAGNOSIS — M99.06 SOMATIC DYSFUNCTION OF BOTH LOWER EXTREMITIES: ICD-10-CM

## 2024-12-27 DIAGNOSIS — M25.551 BILATERAL HIP PAIN: ICD-10-CM

## 2024-12-27 DIAGNOSIS — M99.04 SOMATIC DYSFUNCTION OF SPINE, SACRAL: ICD-10-CM

## 2024-12-27 DIAGNOSIS — M54.2 NECK PAIN: Primary | Chronic | ICD-10-CM

## 2024-12-27 DIAGNOSIS — R00.2 PALPITATIONS: ICD-10-CM

## 2024-12-27 DIAGNOSIS — M25.552 BILATERAL HIP PAIN: ICD-10-CM

## 2024-12-27 DIAGNOSIS — G43.809 OTHER MIGRAINE WITHOUT STATUS MIGRAINOSUS, NOT INTRACTABLE: ICD-10-CM

## 2024-12-27 DIAGNOSIS — M99.08 SOMATIC DYSFUNCTION OF RIB: ICD-10-CM

## 2024-12-27 DIAGNOSIS — M99.02 SOMATIC DYSFUNCTION OF SPINE, THORACIC: ICD-10-CM

## 2024-12-27 DIAGNOSIS — M99.01 SOMATIC DYSFUNCTION OF SPINE, CERVICAL: ICD-10-CM

## 2024-12-27 PROCEDURE — 99214 OFFICE O/P EST MOD 30 MIN: CPT | Performed by: FAMILY MEDICINE

## 2024-12-27 PROCEDURE — 98928 OSTEOPATH MANJ 7-8 REGIONS: CPT | Performed by: FAMILY MEDICINE

## 2024-12-27 RX ORDER — PROPRANOLOL HYDROCHLORIDE 120 MG/1
120 CAPSULE, EXTENDED RELEASE ORAL DAILY
Qty: 90 CAPSULE | Refills: 3 | Status: SHIPPED | OUTPATIENT
Start: 2024-12-27 | End: 2025-12-27

## 2024-12-27 ASSESSMENT — ENCOUNTER SYMPTOMS
COUGH: 0
BACK PAIN: 1

## 2024-12-27 NOTE — PROGRESS NOTES
Subjective   Patient ID: Bekah Madrid is a 49 y.o. female who presents for Back Pain (Patient presents for OMT).    Bekah is having pain in her neck and hips. Feels achy. Symptoms have been worse since cold weather set in.     Patient needs propranolol 120mg sent to [facility] since her insurance changed. Palpitations have been well-controlled on current dose. Migraines stable.          Review of Systems   HENT:  Negative for congestion.    Respiratory:  Negative for cough.    Musculoskeletal:  Positive for back pain.       Objective   /76   Pulse 55   Temp 36.2 °C (97.2 °F)   Wt 70.3 kg (155 lb)   SpO2 99%   BMI 29.29 kg/m²     Physical Exam  Constitutional:       General: She is not in acute distress.     Appearance: Normal appearance.   HENT:      Head: Normocephalic and atraumatic.   Eyes:      Extraocular Movements: Extraocular movements intact.   Pulmonary:      Effort: Pulmonary effort is normal.   Musculoskeletal:      Cervical back: Tenderness present. No edema, rigidity or bony tenderness.      Thoracic back: No bony tenderness.      Lumbar back: Tenderness present. No edema or bony tenderness.   Skin:     General: Skin is warm and dry.   Neurological:      General: No focal deficit present.      Mental Status: She is alert.      Comments: +5/5 bilateral lower extremities   Psychiatric:         Mood and Affect: Mood normal.         Osteopathic exam:  - Cervical: C4 FRlSl  - Thoracic: T3 FRlSl, T10 ERrSl  - Ribs: Left 1st rib inhalation dysfunction  - Lumbar: left quadratus lumborumtrigger point  - Pelvis: right posterior innominate  - Sacrum: left flexion  - Lower extremities: Left adductor restriction with tissue texture changes      Bekah was seen today for back pain.  Diagnoses and all orders for this visit:  Neck pain (Primary)  Comments:  Recommend apply heat to affected area.  Bilateral hip pain  Comments:  Continue stretching.  Palpitations  Comments:  Stable. Continue propranolol.  Orders:  -      propranolol LA (Inderal LA) 120 mg 24 hr capsule; Take 1 capsule (120 mg) by mouth once daily. Do not crush, chew, or split.  Other migraine without status migrainosus, not intractable  Comments:  Stable.  Orders:  -     propranolol LA (Inderal LA) 120 mg 24 hr capsule; Take 1 capsule (120 mg) by mouth once daily. Do not crush, chew, or split.  Somatic dysfunction of spine, cervical  Somatic dysfunction of spine, thoracic  Somatic dysfunction of rib  Somatic dysfunction of pelvic region  Somatic dysfunction of spine, lumbar  Somatic dysfunction of spine, sacral  Somatic dysfunction of both lower extremities           Somatic dysfunction treated with muscle energy, myofascial release, FPR and soft tissue technique. Patient tolerated treatment well and reported improved pain after treatment.    Recommend drinks fluids. Follow-up in 4-6 weeks for further treatment.

## 2025-01-10 ENCOUNTER — APPOINTMENT (OUTPATIENT)
Dept: PRIMARY CARE | Facility: CLINIC | Age: 50
End: 2025-01-10
Payer: COMMERCIAL

## 2025-01-16 DIAGNOSIS — G43.009 MIGRAINE WITHOUT AURA AND WITHOUT STATUS MIGRAINOSUS, NOT INTRACTABLE: Chronic | ICD-10-CM

## 2025-01-17 RX ORDER — SUMATRIPTAN SUCCINATE 100 MG/1
TABLET ORAL
Qty: 9 TABLET | Refills: 0 | Status: SHIPPED | OUTPATIENT
Start: 2025-01-17

## 2025-01-27 ENCOUNTER — APPOINTMENT (OUTPATIENT)
Dept: NEUROLOGY | Facility: HOSPITAL | Age: 50
End: 2025-01-27
Payer: COMMERCIAL

## 2025-02-07 ENCOUNTER — APPOINTMENT (OUTPATIENT)
Dept: PRIMARY CARE | Facility: CLINIC | Age: 50
End: 2025-02-07
Payer: COMMERCIAL

## 2025-02-07 VITALS
WEIGHT: 156 LBS | BODY MASS INDEX: 29.48 KG/M2 | HEART RATE: 63 BPM | OXYGEN SATURATION: 97 % | DIASTOLIC BLOOD PRESSURE: 82 MMHG | TEMPERATURE: 97.5 F | SYSTOLIC BLOOD PRESSURE: 118 MMHG

## 2025-02-07 DIAGNOSIS — M99.04 SOMATIC DYSFUNCTION OF SPINE, SACRAL: ICD-10-CM

## 2025-02-07 DIAGNOSIS — M99.02 SOMATIC DYSFUNCTION OF SPINE, THORACIC: ICD-10-CM

## 2025-02-07 DIAGNOSIS — M25.552 BILATERAL HIP PAIN: ICD-10-CM

## 2025-02-07 DIAGNOSIS — R79.89 LOW VITAMIN D LEVEL: Chronic | ICD-10-CM

## 2025-02-07 DIAGNOSIS — J98.4 RESTRICTIVE LUNG DISEASE: Primary | Chronic | ICD-10-CM

## 2025-02-07 DIAGNOSIS — B00.1 COLD SORE: Chronic | ICD-10-CM

## 2025-02-07 DIAGNOSIS — G43.809 OTHER MIGRAINE WITHOUT STATUS MIGRAINOSUS, NOT INTRACTABLE: ICD-10-CM

## 2025-02-07 DIAGNOSIS — M54.2 NECK PAIN: Chronic | ICD-10-CM

## 2025-02-07 DIAGNOSIS — M25.551 BILATERAL HIP PAIN: ICD-10-CM

## 2025-02-07 DIAGNOSIS — G43.009 MIGRAINE WITHOUT AURA AND WITHOUT STATUS MIGRAINOSUS, NOT INTRACTABLE: Chronic | ICD-10-CM

## 2025-02-07 DIAGNOSIS — M99.06 SOMATIC DYSFUNCTION OF BOTH LOWER EXTREMITIES: ICD-10-CM

## 2025-02-07 DIAGNOSIS — M99.01 SOMATIC DYSFUNCTION OF SPINE, CERVICAL: ICD-10-CM

## 2025-02-07 DIAGNOSIS — M54.81 OCCIPITAL NEURALGIA OF LEFT SIDE: ICD-10-CM

## 2025-02-07 DIAGNOSIS — M99.08 SOMATIC DYSFUNCTION OF RIB: ICD-10-CM

## 2025-02-07 DIAGNOSIS — K59.04 CHRONIC IDIOPATHIC CONSTIPATION: ICD-10-CM

## 2025-02-07 DIAGNOSIS — R00.2 PALPITATIONS: ICD-10-CM

## 2025-02-07 DIAGNOSIS — M99.05 SOMATIC DYSFUNCTION OF PELVIC REGION: ICD-10-CM

## 2025-02-07 PROCEDURE — 99214 OFFICE O/P EST MOD 30 MIN: CPT | Performed by: FAMILY MEDICINE

## 2025-02-07 RX ORDER — SUMATRIPTAN SUCCINATE 100 MG/1
100 TABLET ORAL DAILY PRN
Qty: 9 TABLET | Refills: 5 | Status: SHIPPED | OUTPATIENT
Start: 2025-02-07

## 2025-02-07 RX ORDER — TOPIRAMATE 25 MG/1
25 TABLET ORAL 2 TIMES DAILY
Qty: 180 TABLET | Refills: 1 | Status: SHIPPED | OUTPATIENT
Start: 2025-02-07

## 2025-02-07 RX ORDER — ALBUTEROL SULFATE 90 UG/1
2 INHALANT RESPIRATORY (INHALATION) EVERY 4 HOURS PRN
Qty: 8 G | Refills: 5 | Status: SHIPPED | OUTPATIENT
Start: 2025-02-07

## 2025-02-07 RX ORDER — VALACYCLOVIR HYDROCHLORIDE 1 G/1
TABLET, FILM COATED ORAL
Qty: 12 TABLET | Refills: 1 | Status: SHIPPED | OUTPATIENT
Start: 2025-02-07

## 2025-02-07 RX ORDER — ACETAMINOPHEN 500 MG
2000 TABLET ORAL DAILY
Qty: 90 CAPSULE | Refills: 1 | Status: SHIPPED | OUTPATIENT
Start: 2025-02-07

## 2025-02-07 RX ORDER — PROPRANOLOL HYDROCHLORIDE 120 MG/1
120 CAPSULE, EXTENDED RELEASE ORAL DAILY
Qty: 90 CAPSULE | Refills: 1 | Status: SHIPPED | OUTPATIENT
Start: 2025-02-07 | End: 2026-02-07

## 2025-02-07 ASSESSMENT — ENCOUNTER SYMPTOMS
SORE THROAT: 0
ARTHRALGIAS: 1
CHILLS: 0
NECK PAIN: 1
COUGH: 0
FEVER: 0

## 2025-02-07 NOTE — PROGRESS NOTES
Subjective   Patient ID: Bekah Madrid is a 49 y.o. female who presents for Neck Pain (Pt presents for OMT) and Hip Pain.    Bekah has been feeling restriction in her hips last week.  Quadriceps feel tight.  She has also been having some neck pain.    She has switched to a new pharmacy and needs all of her prescriptions sent.    Her breathing has been stable. Using albuterol as needed.     Using sumatriptan and needed for migraines. Migraines have been well-controlled with topiramate.          Review of Systems   Constitutional:  Negative for chills and fever.   HENT:  Negative for congestion and sore throat.    Respiratory:  Negative for cough.    Musculoskeletal:  Positive for arthralgias and neck pain.       Objective   /82   Pulse 63   Temp 36.4 °C (97.5 °F)   Wt 70.8 kg (156 lb)   SpO2 97%   BMI 29.48 kg/m²     Physical Exam  Constitutional:       General: She is not in acute distress.     Appearance: Normal appearance.   HENT:      Head: Normocephalic and atraumatic.   Eyes:      Extraocular Movements: Extraocular movements intact.   Pulmonary:      Effort: Pulmonary effort is normal.   Musculoskeletal:      Cervical back: Tenderness present. No edema, rigidity or bony tenderness.      Thoracic back: No bony tenderness.      Lumbar back: Tenderness present. No edema or bony tenderness.   Skin:     General: Skin is warm and dry.   Neurological:      General: No focal deficit present.      Mental Status: She is alert.      Comments: +5/5 bilateral lower extremities   Psychiatric:         Mood and Affect: Mood normal.         Osteopathic exam:  - Cervical: C3 FRlSl  - Thoracic: T4-6 ERrSl  - Ribs: Left 1st rib inhalation dysfunction  - Lumbar: left quadratus lumborumtrigger point  - Pelvis: right posterior innominate; right iliopsoas restriction  - Sacrum: left flexion  - Lower extremities: bilateral quadriceps restriction, worse on right      Bekah was seen today for neck pain and hip  pain.  Diagnoses and all orders for this visit:  Restrictive lung disease (Primary)  -     albuterol 90 mcg/actuation inhaler; Inhale 2 puffs every 4 hours if needed for wheezing.  Migraine without aura and without status migrainosus, not intractable  -     topiramate (Topamax) 25 mg tablet; Take 1 tablet (25 mg) by mouth 2 times a day.  -     SUMAtriptan (Imitrex) 100 mg tablet; Take 1 tablet (100 mg) by mouth once daily as needed for migraine. May repeat dose x1 after at least 1 hour. Max 200mg/24h.  Palpitations  Comments:  Stable. Continue propranolol.  Orders:  -     propranolol LA (Inderal LA) 120 mg 24 hr capsule; Take 1 capsule (120 mg) by mouth once daily. Do not crush, chew, or split.  Other migraine without status migrainosus, not intractable  Comments:  Stable.  Orders:  -     propranolol LA (Inderal LA) 120 mg 24 hr capsule; Take 1 capsule (120 mg) by mouth once daily. Do not crush, chew, or split.  Chronic idiopathic constipation  Occipital neuralgia of left side  -     topiramate (Topamax) 25 mg tablet; Take 1 tablet (25 mg) by mouth 2 times a day.  Low vitamin D level  -     cholecalciferol (Vitamin D-3) 50 mcg (2,000 unit) capsule; Take 1 capsule (50 mcg) by mouth once daily.  Cold sore  -     valACYclovir (Valtrex) 1 gram tablet; Take 2 tabs PO q12h x1 day. Start ASAP after symptom onset.  Neck pain  Bilateral hip pain  Somatic dysfunction of spine, cervical  Somatic dysfunction of spine, thoracic  Somatic dysfunction of rib  Somatic dysfunction of pelvic region  Somatic dysfunction of spine, sacral  Somatic dysfunction of both lower extremities             Somatic dysfunction treated with muscle energy, myofascial release. Patient tolerated treatment well and reported improved pain after treatment.    Recommend drinks fluids. Follow-up in 4-6 weeks for further treatment.

## 2025-03-07 ENCOUNTER — APPOINTMENT (OUTPATIENT)
Dept: PRIMARY CARE | Facility: CLINIC | Age: 50
End: 2025-03-07
Payer: COMMERCIAL

## 2025-03-07 VITALS
TEMPERATURE: 97.4 F | HEART RATE: 80 BPM | OXYGEN SATURATION: 98 % | BODY MASS INDEX: 29.66 KG/M2 | DIASTOLIC BLOOD PRESSURE: 72 MMHG | SYSTOLIC BLOOD PRESSURE: 124 MMHG | WEIGHT: 157 LBS

## 2025-03-07 DIAGNOSIS — M54.2 NECK PAIN: Chronic | ICD-10-CM

## 2025-03-07 DIAGNOSIS — M99.02 SOMATIC DYSFUNCTION OF SPINE, THORACIC: ICD-10-CM

## 2025-03-07 DIAGNOSIS — Z97.5 IUD (INTRAUTERINE DEVICE) IN PLACE: ICD-10-CM

## 2025-03-07 DIAGNOSIS — M99.06 SOMATIC DYSFUNCTION OF BOTH LOWER EXTREMITIES: ICD-10-CM

## 2025-03-07 DIAGNOSIS — N92.6 IRREGULAR MENSES: Primary | ICD-10-CM

## 2025-03-07 DIAGNOSIS — G43.009 MIGRAINE WITHOUT AURA AND WITHOUT STATUS MIGRAINOSUS, NOT INTRACTABLE: Chronic | ICD-10-CM

## 2025-03-07 DIAGNOSIS — M99.08 SOMATIC DYSFUNCTION OF RIB: ICD-10-CM

## 2025-03-07 DIAGNOSIS — M99.03 SOMATIC DYSFUNCTION OF SPINE, LUMBAR: ICD-10-CM

## 2025-03-07 DIAGNOSIS — M99.01 SOMATIC DYSFUNCTION OF SPINE, CERVICAL: ICD-10-CM

## 2025-03-07 DIAGNOSIS — M99.05 SOMATIC DYSFUNCTION OF PELVIC REGION: ICD-10-CM

## 2025-03-07 DIAGNOSIS — M99.04 SOMATIC DYSFUNCTION OF SPINE, SACRAL: ICD-10-CM

## 2025-03-07 PROCEDURE — 99214 OFFICE O/P EST MOD 30 MIN: CPT | Performed by: FAMILY MEDICINE

## 2025-03-07 PROCEDURE — 98928 OSTEOPATH MANJ 7-8 REGIONS: CPT | Performed by: FAMILY MEDICINE

## 2025-03-07 ASSESSMENT — ENCOUNTER SYMPTOMS
COUGH: 0
SORE THROAT: 0
CHILLS: 0
BACK PAIN: 1

## 2025-03-07 NOTE — ASSESSMENT & PLAN NOTE
Patient likely entering perimenopause.  She declined pregnancy test since she has not been sexually active in the last 6 months.  Recommend patient to see OB/GYN for exchange of her IUD since she believes it is .

## 2025-03-07 NOTE — PROGRESS NOTES
Subjective   Patient ID: Bekah Madrid is a 49 y.o. female who presents for Back Pain (Recheck//Pt presents for OMT).    Bekah has been feeling well.  Her hips and low back have not been as much pain over the last few weeks.  She is having more neck pain today, and the right side of her neck feels tight.    She has noticed an issue with her periods.  Typically her cycles lasted approximately 21 days, but now she had an irregular cycle that was 1-1/2 months long.  She has not been sexually active in the last 6 months.  She does have an IUD and is unsure when it was placed, but she thinks it may have been about 10 years since the ParaGard was placed.         Review of Systems   Constitutional:  Negative for chills.   HENT:  Negative for congestion and sore throat.    Respiratory:  Negative for cough.    Musculoskeletal:  Positive for back pain.       Objective   /72   Pulse 80   Temp 36.3 °C (97.4 °F)   Wt 71.2 kg (157 lb)   SpO2 98%   BMI 29.66 kg/m²     Physical Exam  Constitutional:       General: She is not in acute distress.     Appearance: Normal appearance.   HENT:      Head: Normocephalic and atraumatic.   Eyes:      Extraocular Movements: Extraocular movements intact.   Pulmonary:      Effort: Pulmonary effort is normal.   Musculoskeletal:      Cervical back: Tenderness present. No edema, rigidity or bony tenderness.      Thoracic back: No bony tenderness.      Lumbar back: Tenderness present. No edema or bony tenderness.   Skin:     General: Skin is warm and dry.   Neurological:      General: No focal deficit present.      Mental Status: She is alert.      Comments: +5/5 bilateral lower extremities   Psychiatric:         Mood and Affect: Mood normal.         Osteopathic exam:  - Cervical: C2-4 FRrSr  - Thoracic: T4-6 ERrSl  - Ribs: right 1st rib inhalation dysfunction  - Lumbar: L2 FRrSr  - Pelvis: right posterior innominate  - Sacrum: left flexion  - Lower extremities: bilateral quadriceps  restriction, worse on right      Bekah was seen today for back pain.  Diagnoses and all orders for this visit:  Irregular menses (Primary)  -     Cancel: Referral to Gynecology; Future  -     Referral to Gynecology; Future  IUD (intrauterine device) in place  -     Cancel: Referral to Gynecology; Future  -     Referral to Gynecology; Future  Migraine without aura and without status migrainosus, not intractable  Neck pain  Somatic dysfunction of spine, cervical  Somatic dysfunction of spine, thoracic  Somatic dysfunction of rib  Somatic dysfunction of pelvic region  Somatic dysfunction of spine, lumbar  Somatic dysfunction of spine, sacral  Somatic dysfunction of both lower extremities               Somatic dysfunction treated with muscle energy, myofascial release. Patient tolerated treatment well and reported improved pain after treatment.    Recommend drinks fluids. Follow-up in 4-6 weeks for further treatment.

## 2025-04-11 ENCOUNTER — APPOINTMENT (OUTPATIENT)
Dept: PRIMARY CARE | Facility: CLINIC | Age: 50
End: 2025-04-11
Payer: COMMERCIAL

## 2025-04-11 VITALS
DIASTOLIC BLOOD PRESSURE: 70 MMHG | OXYGEN SATURATION: 98 % | HEART RATE: 70 BPM | TEMPERATURE: 97 F | SYSTOLIC BLOOD PRESSURE: 122 MMHG

## 2025-04-11 DIAGNOSIS — M99.03 SOMATIC DYSFUNCTION OF SPINE, LUMBAR: ICD-10-CM

## 2025-04-11 DIAGNOSIS — Q74.8: Primary | ICD-10-CM

## 2025-04-11 DIAGNOSIS — M99.02 SOMATIC DYSFUNCTION OF SPINE, THORACIC: ICD-10-CM

## 2025-04-11 DIAGNOSIS — M99.04 SOMATIC DYSFUNCTION OF SPINE, SACRAL: ICD-10-CM

## 2025-04-11 DIAGNOSIS — M99.08 SOMATIC DYSFUNCTION OF RIB: ICD-10-CM

## 2025-04-11 DIAGNOSIS — M99.01 SOMATIC DYSFUNCTION OF SPINE, CERVICAL: ICD-10-CM

## 2025-04-11 DIAGNOSIS — G89.29 CHRONIC BILATERAL LOW BACK PAIN WITHOUT SCIATICA: Chronic | ICD-10-CM

## 2025-04-11 DIAGNOSIS — M41.9 SCOLIOSIS, UNSPECIFIED SCOLIOSIS TYPE, UNSPECIFIED SPINAL REGION: ICD-10-CM

## 2025-04-11 DIAGNOSIS — M54.2 NECK PAIN: ICD-10-CM

## 2025-04-11 DIAGNOSIS — M54.50 CHRONIC BILATERAL LOW BACK PAIN WITHOUT SCIATICA: Chronic | ICD-10-CM

## 2025-04-11 DIAGNOSIS — M99.05 SOMATIC DYSFUNCTION OF PELVIC REGION: ICD-10-CM

## 2025-04-11 DIAGNOSIS — M99.06 SOMATIC DYSFUNCTION OF BOTH LOWER EXTREMITIES: ICD-10-CM

## 2025-04-11 PROCEDURE — 99213 OFFICE O/P EST LOW 20 MIN: CPT | Performed by: FAMILY MEDICINE

## 2025-04-11 PROCEDURE — 98928 OSTEOPATH MANJ 7-8 REGIONS: CPT | Performed by: FAMILY MEDICINE

## 2025-04-11 ASSESSMENT — ENCOUNTER SYMPTOMS
COUGH: 0
SORE THROAT: 0
BACK PAIN: 1

## 2025-04-11 NOTE — PROGRESS NOTES
Subjective   Patient ID: Bekah Madrid is a 50 y.o. female who presents for Back Pain (Recheck//Pt presents for OMT).    Bekah has been feeling well.  Her only concern is that her low back has been in worse pain than usual over the last few weeks.  She has been lifting more at work.  Pain located across the low back without radiation into the legs.  She also would like to go back to the orthopedic specialist that she saw last year to reevaluate her scoliosis.         Review of Systems   HENT:  Negative for congestion and sore throat.    Respiratory:  Negative for cough.    Musculoskeletal:  Positive for back pain.       Objective   /70   Pulse 70   Temp 36.1 °C (97 °F)   SpO2 98%     Physical Exam  Constitutional:       General: She is not in acute distress.     Appearance: Normal appearance.   HENT:      Head: Normocephalic and atraumatic.   Eyes:      Extraocular Movements: Extraocular movements intact.   Pulmonary:      Effort: Pulmonary effort is normal.   Musculoskeletal:      Cervical back: Tenderness present. No edema, rigidity or bony tenderness.      Thoracic back: No bony tenderness.      Lumbar back: Tenderness present. No edema or bony tenderness.   Skin:     General: Skin is warm and dry.   Neurological:      General: No focal deficit present.      Mental Status: She is alert.      Comments: +5/5 bilateral lower extremities   Psychiatric:         Mood and Affect: Mood normal.         Osteopathic exam:  - Cervical: C5 FRlSl  - Thoracic: T4-6 FRrSr  - Ribs: right 1st rib inhalation dysfunction  - Lumbar: L2 FRrSr, trigger point at left quadratus lumborum  - Pelvis: right posterior innominate  - Sacrum: left flexion  - Lower extremities: bilateral quadriceps restriction, worse on right      Bekah was seen today for back pain.  Diagnoses and all orders for this visit:  Schwartz's syndrome (Primary)  -     Referral to Orthopedics and Sports Medicine; Future  Scoliosis, unspecified scoliosis type,  unspecified spinal region  -     Referral to Orthopedics and Sports Medicine; Future  Chronic bilateral low back pain without sciatica  -     Referral to Orthopedics and Sports Medicine; Future  Neck pain  Somatic dysfunction of spine, cervical  Somatic dysfunction of spine, thoracic  Somatic dysfunction of rib  Somatic dysfunction of pelvic region  Somatic dysfunction of spine, lumbar  Somatic dysfunction of spine, sacral  Somatic dysfunction of both lower extremities  Other orders  -     Follow Up In Primary Care; Future      Somatic dysfunction treated with muscle energy, myofascial release. Patient tolerated treatment well and reported improved pain after treatment.    Recommend drinks fluids. Follow-up in 4-6 weeks for further treatment.

## 2025-05-16 ENCOUNTER — OFFICE VISIT (OUTPATIENT)
Dept: NEUROSURGERY | Facility: CLINIC | Age: 50
End: 2025-05-16
Payer: COMMERCIAL

## 2025-05-16 ENCOUNTER — APPOINTMENT (OUTPATIENT)
Dept: PRIMARY CARE | Facility: CLINIC | Age: 50
End: 2025-05-16
Payer: COMMERCIAL

## 2025-05-16 VITALS
SYSTOLIC BLOOD PRESSURE: 146 MMHG | TEMPERATURE: 98.8 F | WEIGHT: 159.7 LBS | DIASTOLIC BLOOD PRESSURE: 90 MMHG | HEIGHT: 61 IN | BODY MASS INDEX: 30.15 KG/M2

## 2025-05-16 DIAGNOSIS — M54.2 NECK PAIN: Primary | ICD-10-CM

## 2025-05-16 DIAGNOSIS — M54.50 CHRONIC BILATERAL LOW BACK PAIN WITHOUT SCIATICA: Chronic | ICD-10-CM

## 2025-05-16 DIAGNOSIS — Q74.8: ICD-10-CM

## 2025-05-16 DIAGNOSIS — M41.9 SCOLIOSIS, UNSPECIFIED SCOLIOSIS TYPE, UNSPECIFIED SPINAL REGION: ICD-10-CM

## 2025-05-16 DIAGNOSIS — G89.29 CHRONIC BILATERAL LOW BACK PAIN WITHOUT SCIATICA: Chronic | ICD-10-CM

## 2025-05-16 PROCEDURE — 3008F BODY MASS INDEX DOCD: CPT | Performed by: NURSE PRACTITIONER

## 2025-05-16 PROCEDURE — 99204 OFFICE O/P NEW MOD 45 MIN: CPT | Performed by: NURSE PRACTITIONER

## 2025-05-16 PROCEDURE — 1036F TOBACCO NON-USER: CPT | Performed by: NURSE PRACTITIONER

## 2025-05-16 ASSESSMENT — PATIENT HEALTH QUESTIONNAIRE - PHQ9
2. FEELING DOWN, DEPRESSED OR HOPELESS: NOT AT ALL
1. LITTLE INTEREST OR PLEASURE IN DOING THINGS: NOT AT ALL
SUM OF ALL RESPONSES TO PHQ9 QUESTIONS 1 & 2: 0

## 2025-05-16 ASSESSMENT — PAIN SCALES - GENERAL: PAINLEVEL_OUTOF10: 5

## 2025-05-16 NOTE — PROGRESS NOTES
[facility]  Department of Neurological Surgery  New Patient Visit        It was a pleasure to see Bekah Madrid on 5/16/2025. She is a 50 y.o., left-handed female who presents to the [facility] for evaluation of neck pain and low back pain. Patient is referred by Alley Cazares *. PMH is significant for HLD, depression, occipital neuralgia, migraines, Schwartz's syndrome, and scoliosis. She has a history of scoliosis surgery at the age of 10 with pederson rods.     Bekah Madrid reports for about the past 5 years low back and neck pain has been progressively worsening. With regards to her neck pain she reports pain is stiff in nature and is between the shoulder blades and up the neck L>R. She denies any radicular arm pain or paresthesias. Some intermittent numbness in the arms bilaterally, L>R. With regards to her low back, she reports she really did not have much pain following her scoliosis surgery until about 5 years ago. She reports back pain is achy in nature and sometimes sharp with certain positions, pain will sometimes radiate into the left hip. Pain is worse in cold and wet weather. She denies any radicular leg pain or paresthesias.  Progression of symptoms prompted today's visit. She reports prolonged sitting and walking make the pain worse, however sometimes walking makes the pain better. Thus far, she has tried ibuprofen, heat and rest which helps take the edge off her pain. Her primary care physician performs manipulations every several weeks with some relief of her pain as well.  She denies any change in bowel / bladder function, or saddle anesthesia. She has difficulty with her balance baseline and denies any significant recent changes. She has not had any recent injections or physical therapy.     PREVIOUS TREATMENTS  NSAIDs; ibuprofen    Previous Spine Surgery: Pederson rods at the age of 10     Smoker: Never    Anticoagulation  / Antiplatelets: None     ROS: 12 / 12 systems reviewed and are negative unless noted in HPI    ON EXAM:  General: Well developed female, awake/alert/oriented x 3, no distress, alert and cooperative. Speech clear.   Skin: Warm and dry, no visible lesions / rashes  ENMT: Mucous membranes moist, no apparent injury  Head/Neck: No apparent injury  Respiratory/Thorax: Normal breathing with good chest expansion, thorax symmetric  Cardiovascular: No pitting edema, no JVD    NEUROLOGICAL EXAM:  EOMI, face symmetric  Motor Strength:   BUE: Boutonniere deformity of the right hand. Grasps/biceps/triceps 5/5, RUE deltoid 4+/5, LUE deltoid 5/5  BLE: PF/DF/KE/HF 5/5  Muscle Tone: Normal without spasticity or contractures in all extremities  Muscle Bulk: Normal and symmetric in all extremities  Posture:  -- Cervical: Normal  -- Thoracic: Normal  -- Lumbar : Normal  Paraspinal muscle spasm/tenderness absent.  No palpable tenderness along the spinous processes.  Sensation: SILT   Negative Odonnell's  Gait: Antalgic - baseline      Bekah Madrid has been seen as a new patient today for neck pain and low back pain. She has a history of scoliosis surgery at the age of 10 with Pederson rods. She does not have any significant motor or sensory deficit on exam. Will obtain EOS scoliosis films to evaluate overall global alignment, as well as dynamic X-rays of the cervical and lumbar spine to rule out any instability. A referral to physical therapy was placed today. I will follow up with her upon completion of imaging studies with any further recommendations at that time. She is understanding and agreeable to this plan.         [place] [id number] [address] [place] [address]     Phone: [phone]  Fax: [phone]

## 2025-05-29 ENCOUNTER — HOSPITAL ENCOUNTER (OUTPATIENT)
Dept: RADIOLOGY | Facility: HOSPITAL | Age: 50
Discharge: HOME | End: 2025-05-29
Payer: COMMERCIAL

## 2025-05-29 DIAGNOSIS — M41.9 SCOLIOSIS, UNSPECIFIED SCOLIOSIS TYPE, UNSPECIFIED SPINAL REGION: ICD-10-CM

## 2025-05-29 DIAGNOSIS — M54.50 CHRONIC BILATERAL LOW BACK PAIN WITHOUT SCIATICA: Chronic | ICD-10-CM

## 2025-05-29 DIAGNOSIS — G89.29 CHRONIC BILATERAL LOW BACK PAIN WITHOUT SCIATICA: Chronic | ICD-10-CM

## 2025-05-29 DIAGNOSIS — Q74.8: ICD-10-CM

## 2025-05-29 DIAGNOSIS — M54.2 NECK PAIN: ICD-10-CM

## 2025-05-29 PROCEDURE — 72050 X-RAY EXAM NECK SPINE 4/5VWS: CPT

## 2025-05-29 PROCEDURE — 72120 X-RAY BEND ONLY L-S SPINE: CPT

## 2025-06-09 DIAGNOSIS — R00.2 PALPITATIONS: ICD-10-CM

## 2025-06-09 DIAGNOSIS — G43.809 OTHER MIGRAINE WITHOUT STATUS MIGRAINOSUS, NOT INTRACTABLE: ICD-10-CM

## 2025-06-09 DIAGNOSIS — K59.04 CHRONIC IDIOPATHIC CONSTIPATION: Primary | Chronic | ICD-10-CM

## 2025-06-09 NOTE — TELEPHONE ENCOUNTER
Pt called rx line at 240p requesting pended med and   Stool softner???    Next OV 6/25  Pharm change   Pt NOW using [employer] Thx

## 2025-06-11 RX ORDER — PROPRANOLOL HYDROCHLORIDE 120 MG/1
120 CAPSULE, EXTENDED RELEASE ORAL DAILY
Qty: 90 CAPSULE | Refills: 1 | Status: SHIPPED | OUTPATIENT
Start: 2025-06-11 | End: 2026-06-11

## 2025-06-11 RX ORDER — DOCUSATE SODIUM 100 MG/1
100 CAPSULE, LIQUID FILLED ORAL DAILY PRN
Qty: 90 CAPSULE | Refills: 3 | Status: SHIPPED | OUTPATIENT
Start: 2025-06-11

## 2025-06-25 ENCOUNTER — APPOINTMENT (OUTPATIENT)
Dept: PRIMARY CARE | Facility: CLINIC | Age: 50
End: 2025-06-25
Payer: COMMERCIAL

## 2025-06-25 VITALS
WEIGHT: 160 LBS | BODY MASS INDEX: 30.23 KG/M2 | TEMPERATURE: 97.5 F | HEART RATE: 72 BPM | SYSTOLIC BLOOD PRESSURE: 108 MMHG | DIASTOLIC BLOOD PRESSURE: 64 MMHG

## 2025-06-25 DIAGNOSIS — M99.06 SOMATIC DYSFUNCTION OF BOTH LOWER EXTREMITIES: ICD-10-CM

## 2025-06-25 DIAGNOSIS — M99.05 SOMATIC DYSFUNCTION OF PELVIC REGION: ICD-10-CM

## 2025-06-25 DIAGNOSIS — M54.50 CHRONIC BILATERAL LOW BACK PAIN WITHOUT SCIATICA: ICD-10-CM

## 2025-06-25 DIAGNOSIS — Z12.31 ENCOUNTER FOR SCREENING MAMMOGRAM FOR MALIGNANT NEOPLASM OF BREAST: ICD-10-CM

## 2025-06-25 DIAGNOSIS — M99.08 SOMATIC DYSFUNCTION OF RIB: ICD-10-CM

## 2025-06-25 DIAGNOSIS — M99.03 SOMATIC DYSFUNCTION OF SPINE, LUMBAR: ICD-10-CM

## 2025-06-25 DIAGNOSIS — M99.02 SOMATIC DYSFUNCTION OF SPINE, THORACIC: ICD-10-CM

## 2025-06-25 DIAGNOSIS — M99.01 SOMATIC DYSFUNCTION OF SPINE, CERVICAL: ICD-10-CM

## 2025-06-25 DIAGNOSIS — M54.2 NECK PAIN: Primary | ICD-10-CM

## 2025-06-25 DIAGNOSIS — M99.04 SOMATIC DYSFUNCTION OF SPINE, SACRAL: ICD-10-CM

## 2025-06-25 DIAGNOSIS — G89.29 CHRONIC BILATERAL LOW BACK PAIN WITHOUT SCIATICA: ICD-10-CM

## 2025-06-25 PROCEDURE — 99396 PREV VISIT EST AGE 40-64: CPT | Performed by: FAMILY MEDICINE

## 2025-06-25 PROCEDURE — 99213 OFFICE O/P EST LOW 20 MIN: CPT | Performed by: FAMILY MEDICINE

## 2025-06-25 ASSESSMENT — ENCOUNTER SYMPTOMS
NECK PAIN: 1
BACK PAIN: 1
COUGH: 0

## 2025-06-25 NOTE — PROGRESS NOTES
Subjective   Patient ID: Bekah Madrid is a 50 y.o. female who presents for Back Pain (Patient presents to OMT).    Bekah has been having more neck and back pain. 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 Feels stiff.          Review of Systems   HENT:  Negative for congestion.    Respiratory:  Negative for cough.    Musculoskeletal:  Positive for back pain and neck pain.       Objective   /64   Pulse 72   Temp 36.4 °C (97.5 °F)   Wt 72.6 kg (160 lb)   BMI 30.23 kg/m²     Physical Exam  Constitutional:       General: She is not in acute distress.     Appearance: Normal appearance.   HENT:      Head: Normocephalic and atraumatic.   Eyes:      Extraocular Movements: Extraocular movements intact.   Pulmonary:      Effort: Pulmonary effort is normal.   Musculoskeletal:      Cervical back: Tenderness present. No edema, rigidity or bony tenderness.      Thoracic back: No bony tenderness.      Lumbar back: Tenderness present. No edema or bony tenderness.   Skin:     General: Skin is warm and dry.   Neurological:      General: No focal deficit present.      Mental Status: She is alert.      Comments: +5/5 bilateral lower extremities   Psychiatric:         Mood and Affect: Mood normal.         Osteopathic exam:  - Cervical: [id number]  - Thoracic: T4 FRrSr  - Ribs: Right 10th rib inhalation dysfunction  - Lumbar: L2 FRrSr  - Pelvis: right posterior innominate  - Sacrum: left flexion  - Lower extremities: Right IT band trigger point      Bekah was seen today for back pain.  Diagnoses and all orders for this visit:  Neck pain (Primary)  Chronic bilateral low back pain without sciatica  Encounter for screening mammogram for malignant neoplasm of breast  -     BI mammo bilateral screening tomosynthesis; Future  Somatic dysfunction of spine, cervical  Somatic dysfunction of spine, thoracic  Somatic dysfunction of rib  Somatic dysfunction of pelvic region  Somatic dysfunction of spine, lumbar  Somatic  dysfunction of spine, sacral  Somatic dysfunction of both lower extremities  Other orders  -     Follow Up In Primary Care  -     Follow Up In Primary Care; Future           Somatic dysfunction treated with muscle energy, myofascial release and soft tissue technique. Patient tolerated treatment well and reported improved pain after treatment.    Recommend drinks fluids. Follow-up in 4-6 weeks for further treatment.

## 2025-07-25 ENCOUNTER — APPOINTMENT (OUTPATIENT)
Dept: RADIOLOGY | Facility: CLINIC | Age: 50
End: 2025-07-25
Payer: COMMERCIAL

## 2025-07-30 ENCOUNTER — APPOINTMENT (OUTPATIENT)
Dept: PRIMARY CARE | Facility: CLINIC | Age: 50
End: 2025-07-30
Payer: COMMERCIAL

## 2025-07-30 VITALS
HEART RATE: 76 BPM | SYSTOLIC BLOOD PRESSURE: 114 MMHG | OXYGEN SATURATION: 97 % | DIASTOLIC BLOOD PRESSURE: 76 MMHG | BODY MASS INDEX: 30.84 KG/M2 | TEMPERATURE: 97.8 F | WEIGHT: 163.2 LBS

## 2025-07-30 DIAGNOSIS — M99.06 SOMATIC DYSFUNCTION OF BOTH LOWER EXTREMITIES: ICD-10-CM

## 2025-07-30 DIAGNOSIS — E78.5 HYPERLIPIDEMIA, UNSPECIFIED HYPERLIPIDEMIA TYPE: ICD-10-CM

## 2025-07-30 DIAGNOSIS — M99.08 SOMATIC DYSFUNCTION OF RIB: ICD-10-CM

## 2025-07-30 DIAGNOSIS — M99.05 SOMATIC DYSFUNCTION OF PELVIC REGION: ICD-10-CM

## 2025-07-30 DIAGNOSIS — M54.50 CHRONIC BILATERAL LOW BACK PAIN WITHOUT SCIATICA: ICD-10-CM

## 2025-07-30 DIAGNOSIS — Z13.0 SCREENING FOR DEFICIENCY ANEMIA: ICD-10-CM

## 2025-07-30 DIAGNOSIS — G89.29 CHRONIC BILATERAL LOW BACK PAIN WITHOUT SCIATICA: ICD-10-CM

## 2025-07-30 DIAGNOSIS — M99.03 SOMATIC DYSFUNCTION OF SPINE, LUMBAR: ICD-10-CM

## 2025-07-30 DIAGNOSIS — Z13.1 SCREENING FOR DIABETES MELLITUS: ICD-10-CM

## 2025-07-30 DIAGNOSIS — M99.02 SOMATIC DYSFUNCTION OF SPINE, THORACIC: ICD-10-CM

## 2025-07-30 DIAGNOSIS — M99.01 SOMATIC DYSFUNCTION OF SPINE, CERVICAL: ICD-10-CM

## 2025-07-30 DIAGNOSIS — M54.2 NECK PAIN: Primary | ICD-10-CM

## 2025-07-30 PROCEDURE — 99213 OFFICE O/P EST LOW 20 MIN: CPT | Performed by: FAMILY MEDICINE

## 2025-07-30 PROCEDURE — 98928 OSTEOPATH MANJ 7-8 REGIONS: CPT | Performed by: FAMILY MEDICINE

## 2025-07-30 ASSESSMENT — PATIENT HEALTH QUESTIONNAIRE - PHQ9
SUM OF ALL RESPONSES TO PHQ9 QUESTIONS 1 AND 2: 0
1. LITTLE INTEREST OR PLEASURE IN DOING THINGS: NOT AT ALL
2. FEELING DOWN, DEPRESSED OR HOPELESS: NOT AT ALL

## 2025-07-30 ASSESSMENT — ENCOUNTER SYMPTOMS
BACK PAIN: 1
COUGH: 0

## 2025-07-30 NOTE — PATIENT INSTRUCTIONS
To schedule an appointment with [facility], please visit this website: [email or web address]

## 2025-07-30 NOTE — PROGRESS NOTES
Subjective   Patient ID: Bekah Madrid is a 50 y.o. female who presents for Back Pain (Recheck, pt presents for OMT).    Bekah has been having neck and low back pain. Doing stretches. Is going to have x-rays done fore orthopedics.          Review of Systems   HENT:  Negative for congestion.    Respiratory:  Negative for cough.    Musculoskeletal:  Positive for back pain.       Objective   /76   Pulse 76   Temp 36.6 °C (97.8 °F)   Wt 74 kg (163 lb 3.2 oz)   SpO2 97%   BMI 30.84 kg/m²     Physical Exam  Constitutional:       General: She is not in acute distress.     Appearance: Normal appearance.   HENT:      Head: Normocephalic and atraumatic.     Eyes:      Extraocular Movements: Extraocular movements intact.     Pulmonary:      Effort: Pulmonary effort is normal.     Musculoskeletal:      Cervical back: Tenderness present. No edema, rigidity or bony tenderness.      Thoracic back: No bony tenderness.      Lumbar back: Tenderness present. No edema or bony tenderness.     Skin:     General: Skin is warm and dry.     Neurological:      General: No focal deficit present.      Mental Status: She is alert.      Comments: +5/5 bilateral lower extremities   Psychiatric:         Mood and Affect: Mood normal.         Osteopathic exam:  - Cervical: left scalene trigger point; C5 ERrSl  - Thoracic: T5 FRlSl  - Ribs: left first rib inhalation dysfunction  - Lumbar: L2 FRrSr  - Pelvis: right posterior innominate  - Sacrum: left flexion  - Lower extremities: Right IT band trigger point      Bekah was seen today for back pain.  Diagnoses and all orders for this visit:  Neck pain (Primary)  Chronic bilateral low back pain without sciatica  Hyperlipidemia, unspecified hyperlipidemia type  -     Cancel: Lipid Panel; Future  -     Lipid Panel; Future  Screening for diabetes mellitus  -     Cancel: Comprehensive Metabolic Panel; Future  -     Cancel: Hemoglobin A1C; Future  -     Comprehensive Metabolic Panel; Future  -      Hemoglobin A1C; Future  Screening for deficiency anemia  -     Cancel: CBC and Auto Differential; Future  -     CBC and Auto Differential; Future  Somatic dysfunction of spine, cervical  Somatic dysfunction of spine, thoracic  Somatic dysfunction of rib  Somatic dysfunction of pelvic region  Somatic dysfunction of spine, lumbar  Somatic dysfunction of both lower extremities  Other orders  -     Follow Up In Primary Care  -     Cancel: Follow Up In Primary Care; Future  -     Cancel: Follow Up In Primary Care; Future  -     Follow Up In Primary Care; Future  -     Follow Up In Primary Care; Future             Somatic dysfunction treated with muscle energy, myofascial release and Still technique. Patient tolerated treatment well and reported improved pain after treatment.    Recommend drinks fluids. Follow-up in 4-6 weeks for further treatment.

## 2025-08-18 ENCOUNTER — HOSPITAL ENCOUNTER (OUTPATIENT)
Dept: RADIOLOGY | Facility: CLINIC | Age: 50
Discharge: HOME | End: 2025-08-18
Payer: COMMERCIAL

## 2025-08-18 DIAGNOSIS — Z12.31 ENCOUNTER FOR SCREENING MAMMOGRAM FOR MALIGNANT NEOPLASM OF BREAST: ICD-10-CM

## 2025-08-18 PROCEDURE — 77067 SCR MAMMO BI INCL CAD: CPT

## 2025-08-18 PROCEDURE — 77063 BREAST TOMOSYNTHESIS BI: CPT | Performed by: RADIOLOGY

## 2025-08-18 PROCEDURE — 77067 SCR MAMMO BI INCL CAD: CPT | Performed by: RADIOLOGY

## 2025-09-02 ENCOUNTER — APPOINTMENT (OUTPATIENT)
Dept: PRIMARY CARE | Facility: CLINIC | Age: 50
End: 2025-09-02
Payer: COMMERCIAL

## 2025-09-12 ENCOUNTER — APPOINTMENT (OUTPATIENT)
Dept: PRIMARY CARE | Facility: CLINIC | Age: 50
End: 2025-09-12
Payer: COMMERCIAL

## 2025-09-15 ENCOUNTER — APPOINTMENT (OUTPATIENT)
Dept: PRIMARY CARE | Facility: CLINIC | Age: 50
End: 2025-09-15
Payer: COMMERCIAL

## 2025-09-22 ENCOUNTER — APPOINTMENT (OUTPATIENT)
Dept: CARDIOLOGY | Facility: CLINIC | Age: 50
End: 2025-09-22
Payer: COMMERCIAL

## 2025-09-26 ENCOUNTER — APPOINTMENT (OUTPATIENT)
Dept: CARDIOLOGY | Facility: HOSPITAL | Age: 50
End: 2025-09-26
Payer: COMMERCIAL

## 2025-10-10 ENCOUNTER — APPOINTMENT (OUTPATIENT)
Dept: CARDIOLOGY | Facility: HOSPITAL | Age: 50
End: 2025-10-10
Payer: COMMERCIAL